# Patient Record
Sex: MALE | Race: WHITE | URBAN - METROPOLITAN AREA
[De-identification: names, ages, dates, MRNs, and addresses within clinical notes are randomized per-mention and may not be internally consistent; named-entity substitution may affect disease eponyms.]

---

## 2017-02-04 ENCOUNTER — EMERGENCY (EMERGENCY)
Facility: HOSPITAL | Age: 52
LOS: 0 days | Discharge: HOME | End: 2017-02-04
Admitting: INTERNAL MEDICINE

## 2017-06-27 DIAGNOSIS — L50.8 OTHER URTICARIA: ICD-10-CM

## 2017-06-27 DIAGNOSIS — Z79.899 OTHER LONG TERM (CURRENT) DRUG THERAPY: ICD-10-CM

## 2018-04-01 ENCOUNTER — EMERGENCY (EMERGENCY)
Facility: HOSPITAL | Age: 53
LOS: 0 days | Discharge: HOME | End: 2018-04-01
Attending: EMERGENCY MEDICINE

## 2018-04-01 VITALS
SYSTOLIC BLOOD PRESSURE: 163 MMHG | DIASTOLIC BLOOD PRESSURE: 109 MMHG | OXYGEN SATURATION: 96 % | RESPIRATION RATE: 16 BRPM | WEIGHT: 175.05 LBS | HEIGHT: 71 IN | TEMPERATURE: 98 F | HEART RATE: 86 BPM

## 2018-04-01 VITALS
RESPIRATION RATE: 18 BRPM | OXYGEN SATURATION: 99 % | DIASTOLIC BLOOD PRESSURE: 95 MMHG | SYSTOLIC BLOOD PRESSURE: 157 MMHG

## 2018-04-01 DIAGNOSIS — Z23 ENCOUNTER FOR IMMUNIZATION: ICD-10-CM

## 2018-04-01 DIAGNOSIS — S01.81XA LACERATION WITHOUT FOREIGN BODY OF OTHER PART OF HEAD, INITIAL ENCOUNTER: ICD-10-CM

## 2018-04-01 DIAGNOSIS — S61.411A LACERATION WITHOUT FOREIGN BODY OF RIGHT HAND, INITIAL ENCOUNTER: ICD-10-CM
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
## 2018-04-01 DIAGNOSIS — S09.90XA UNSPECIFIED INJURY OF HEAD, INITIAL ENCOUNTER: ICD-10-CM
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
## 2018-04-01 DIAGNOSIS — Z79.891 LONG TERM (CURRENT) USE OF OPIATE ANALGESIC: ICD-10-CM
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
## 2018-04-01 DIAGNOSIS — Z98.890 OTHER SPECIFIED POSTPROCEDURAL STATES: Chronic | ICD-10-CM

## 2018-04-01 DIAGNOSIS — Z98.890 OTHER SPECIFIED POSTPROCEDURAL STATES: ICD-10-CM
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
## 2018-04-01 RX ORDER — MORPHINE SULFATE 50 MG/1
1 CAPSULE, EXTENDED RELEASE ORAL
Qty: 0 | Refills: 0 | COMMUNITY

## 2018-04-01 RX ORDER — TETANUS TOXOID, REDUCED DIPHTHERIA TOXOID AND ACELLULAR PERTUSSIS VACCINE, ADSORBED 5; 2.5; 8; 8; 2.5 [IU]/.5ML; [IU]/.5ML; UG/.5ML; UG/.5ML; UG/.5ML
0.5 SUSPENSION INTRAMUSCULAR ONCE
Qty: 0 | Refills: 0 | Status: COMPLETED | OUTPATIENT
Start: 2018-04-01 | End: 2018-04-01

## 2018-04-01 RX ORDER — OXYCODONE HYDROCHLORIDE 5 MG/1
1 TABLET ORAL
Qty: 0 | Refills: 0 | COMMUNITY

## 2018-04-01 RX ADMIN — TETANUS TOXOID, REDUCED DIPHTHERIA TOXOID AND ACELLULAR PERTUSSIS VACCINE, ADSORBED 0.5 MILLILITER(S): 5; 2.5; 8; 8; 2.5 SUSPENSION INTRAMUSCULAR at 22:35

## 2018-04-01 NOTE — ED PROVIDER NOTE - PROGRESS NOTE DETAILS
Lacerations sutured. TDAP updated at today's visit. Return precautions discussed at length and verbalized agreement. Instructed pt 5 days for facial laceration and 14 days for hand laceration.

## 2018-04-01 NOTE — ED PROVIDER NOTE - OBJECTIVE STATEMENT
Pt is a 52 y/o Male, PMHX of chronic back pain, presents to ED with lacerations s/p fall. Pt reports he was leaving Easter dinner, was outside and turned around to say goodbye to his family, when he became unsteady due to carrying bags in his hands and fell. Has laceration to right side of face and on right hand. Denies LOC, numbness, tingling, weakness, blood thinning meds, bleeding disorders.

## 2018-04-01 NOTE — ED PROVIDER NOTE - ATTENDING CONTRIBUTION TO CARE
I personally evaluated the patient. I reviewed the Resident’s or Physician Assistant’s note (as assigned above), and agree with the findings and plan except as documented in my note.  Pt with lacerations as above s/p mechanical trip and fall, no loc, has right eyebrow laceration, perrla (s/p cataract sx), eomi, no orbital rim stepoff or ttp, neck supple neuro intact, and lac over palmar aspect 5th mcpj, no bony ttp, FROM NVI no tendon injury, wounds repaired. Patient counseled regarding conditions which should prompt return.

## 2018-04-01 NOTE — ED PROVIDER NOTE - CARE PLAN
Principal Discharge DX:	Facial laceration, initial encounter  Secondary Diagnosis:	Laceration of right hand without foreign body, initial encounter  Secondary Diagnosis:	Closed head injury, initial encounter

## 2018-04-01 NOTE — ED PROVIDER NOTE - PHYSICAL EXAMINATION
CONSTITUTIONAL: Awake, alert. Well-developed; well-nourished; in no distress. Non-toxic appearing.   SKIN: + 2.5 cm laceration to right side of face just above right eyebrow; bleeding controlled. Wound explored to base in bloodless field with no FB or tendon injury. + 3 cm laceration to right hand on ventral lateral aspect; bleeding controlled; wound explored to base in bloodless field with no FB or tendon injury.   HEAD: See skin as documented above. No step offs or tenderness.   EYES: PERRL, EOM intact.   NECK: Supple; non-tender.  CARD: No chest wall deformity or tenderness. S1, S2 normal; no murmurs, gallops, or rubs. Regular rate and rhythm.  RESP: Good air movement. Lungs CTAB. No crackles, wheezes, rales or rhonchi.  ABD: Soft; non-distended; non-tender. No rebound/guarding/rigidity.   EXT: See skin as documented above. No bony deformity or tenderness. Flexion and extension intact. Normal ROM x 4 extremities. No midline spinal ttp.   NEURO: Strength 5/5 UE b/l. No sensory deficits. n/v intact UE b/l, pulses symmetrical.   PSYCH: Cooperative, appropriate.

## 2018-04-01 NOTE — ED PROVIDER NOTE - SECONDARY DIAGNOSIS.
Laceration of right hand without foreign body, initial encounter Closed head injury, initial encounter

## 2018-04-01 NOTE — ED PROVIDER NOTE - NS ED ROS FT
GENERAL: Denies fever/chills, loss of appetite/weight or fatigue.  SKIN: + laceration to right side of face. + laceration to right hand.   HEAD: + closed head injury.   EYES: Denies blurry vision, diplopia, or photophobia.  CARDIAC: Denies chest pain, palpitations, or SOB.   RESPIRATORY: Denies SOB, cough, hemoptysis or wheezing.   GI: Denies abdominal pain, n/v/d.   MSK: Denies myalgias, bony deformity or pain.   NEURO: Denies paresthesias, tingling or weakness.

## 2018-04-06 ENCOUNTER — TRANSCRIPTION ENCOUNTER (OUTPATIENT)
Age: 53
End: 2018-04-06

## 2018-04-15 ENCOUNTER — TRANSCRIPTION ENCOUNTER (OUTPATIENT)
Age: 53
End: 2018-04-15

## 2018-10-16 PROBLEM — M54.9 DORSALGIA, UNSPECIFIED: Chronic | Status: ACTIVE | Noted: 2018-04-01

## 2018-10-30 PROBLEM — Z00.00 ENCOUNTER FOR PREVENTIVE HEALTH EXAMINATION: Status: ACTIVE | Noted: 2018-10-30

## 2019-01-02 ENCOUNTER — APPOINTMENT (OUTPATIENT)
Dept: ORTHOPEDIC SURGERY | Facility: CLINIC | Age: 54
End: 2019-01-02
Payer: MEDICAID

## 2019-01-02 ENCOUNTER — TRANSCRIPTION ENCOUNTER (OUTPATIENT)
Age: 54
End: 2019-01-02

## 2019-01-02 VITALS
WEIGHT: 180 LBS | BODY MASS INDEX: 25.2 KG/M2 | DIASTOLIC BLOOD PRESSURE: 70 MMHG | HEIGHT: 71 IN | SYSTOLIC BLOOD PRESSURE: 100 MMHG

## 2019-01-02 DIAGNOSIS — Z60.2 PROBLEMS RELATED TO LIVING ALONE: ICD-10-CM

## 2019-01-02 DIAGNOSIS — Z87.39 PERSONAL HISTORY OF OTHER DISEASES OF THE MUSCULOSKELETAL SYSTEM AND CONNECTIVE TISSUE: ICD-10-CM

## 2019-01-02 DIAGNOSIS — Z87.891 PERSONAL HISTORY OF NICOTINE DEPENDENCE: ICD-10-CM

## 2019-01-02 DIAGNOSIS — Z78.9 OTHER SPECIFIED HEALTH STATUS: ICD-10-CM

## 2019-01-02 PROCEDURE — 99204 OFFICE O/P NEW MOD 45 MIN: CPT

## 2019-01-02 RX ORDER — MULTIVITAMIN
TABLET ORAL
Refills: 0 | Status: ACTIVE | COMMUNITY

## 2019-01-02 RX ORDER — TURMERIC ROOT EXTRACT 500 MG
TABLET ORAL
Refills: 0 | Status: ACTIVE | COMMUNITY

## 2019-01-02 SDOH — SOCIAL STABILITY - SOCIAL INSECURITY: PROBLEMS RELATED TO LIVING ALONE: Z60.2

## 2019-03-05 ENCOUNTER — TRANSCRIPTION ENCOUNTER (OUTPATIENT)
Age: 54
End: 2019-03-05

## 2019-03-12 ENCOUNTER — APPOINTMENT (OUTPATIENT)
Dept: ORTHOPEDIC SURGERY | Facility: CLINIC | Age: 54
End: 2019-03-12
Payer: MEDICAID

## 2019-03-12 PROCEDURE — 99215 OFFICE O/P EST HI 40 MIN: CPT

## 2019-03-12 RX ORDER — DIAZEPAM 5 MG/1
5 TABLET ORAL
Qty: 1 | Refills: 0 | Status: DISCONTINUED | COMMUNITY
Start: 2019-01-02 | End: 2019-03-12

## 2019-03-25 RX ORDER — CHROMIUM 200 MCG
TABLET ORAL
Refills: 0 | Status: ACTIVE | COMMUNITY

## 2019-03-27 NOTE — DISCUSSION/SUMMARY
[de-identified] : Discussed the results of the patient's history, physical exam, and imaging. Mr. Bower continues to have low back pain that radiates posteriorly down both legs to his feet. Significantly disabled by symptoms. MRI lumbar with and without contrast shows L5/S1 laminectomy, L4-S1 posterior spinal fusion, well decompressed, no adjacent segment disease. ESR/CRP/CBC within normal limits. There is no indication for surgical intervention at this time. I am recommending a spinal cord stimulator trial, referral to Dr. Garcia given. The patient will follow up with me as needed.  All questions were answered.

## 2019-03-27 NOTE — PHYSICAL EXAM
[de-identified] : Spine: General: patient is well developed, well nourished, in no acute \par distress, alert and oriented x 3. \par \par Mood and affect: normal\par \par Respiratory: no respiratory distress noted\par \par Skin: no scars over spine, skin intact, no erythema, increased warmth\par \par Alignment:The spine is well compensated in the coronal and sagittal plane. There is no asymmetry on the pozo forward bend test\par \par Gait: The patient is able to toe walk and heel walk without difficulty. The patient is able to tandem gait without difficulty.\par \par Palpation: no tenderness to palpation spine or paraspinal region\par \par Range of motion: Lumbar spine ROM is restricted\par \par Neurologic Exam:\par Motor: Manual Muscle testing in the lower extremities is 5 out of 5 in all muscle groups. There is no evidence of muscular atrophy in the lower extremities. Sensory: Sensation to light touch is grossly intact in the lower extremities\par \par Reflexes: DTR are present and symmetric throughout, negative davis bilaterally, negative inverted radial reflex bilaterally, no clonus, plantar responses are flexor\par \par Hip Exam: No pain with internal or external rotation of hips bilaterally\par \par Special tests: Straight leg raise test negative. Cross straight leg test negative. CELY test negative\par \par Vascular: Examination of the peripheral vascular system demonstrates no evidence of congestion or edema. no evidence of lymphedema bilateral lower extremities, pulses are present and symmetric in both lower extremities. [de-identified] : MRI lumbar with and without contrast 1/30/19: s/p L5/S1 laminectomy and posterior fusion L4-S1, well decompressed L5/S1, no canal or foraminal stenosis; L2/3 and L3/4 mild disc bulge with mild flattening of ventral cord, mild bilateral foraminal stenosis\par \par CT Lumbar 9/2018: L4-S1 instrumented fusion, appears solid with no loosening of the hardware. No significant stenosis seen. \par \par MRI 2016: s/p L4-S1 instrumented fusion with no residual or adjacent segment stenosis.\par \par XR 1/2019: L4-S1 instrumented fusion, no hardware failure, maintained alignment, no dynamic instability. \par \par

## 2019-03-27 NOTE — HISTORY OF PRESENT ILLNESS
[de-identified] : Follow Up 3/12/219: Patient continues to have low back pain that radiates posteriorly down both legs to his feet, unchanged. Also reports paresthesias in the right foot. Denies lower extremity weakness. Denies bowel/bladder symptoms. Here to discuss blood work and MRI lumbar.\par \par Initial 1/2/19: Mr. JOHNSON is a very pleasant 53 year old male who complains of low back pain for 9 years, atraumatic. Pain was initially localized to lower back. Patient had lumbar laminectomy/posterior fusion in 2012, with mild relief of low back pain. Patient reports low back pain began to radiate posteriorly down both legs to his feet after an ablation in 2014. Had revision laminectomy and extension of fusion in 2016, without any relief of back or leg pain. Leg pain has worsened gradually over the past 2 years. On initial presentation symptoms were as follows: Patient described the pain as constant. Patient rated the pain as 8/10 today, average 8/10 this week, 10/10 at its worst. Patient reports numbness and paresthesias in his right foot. The pain is relieved by nothing in particular. The pain is worsened by activity. Past treatments included pharmacologic management, with minimal temporary relief. The patient has not had recent physical therapy or steroid injections.\par \par The patient reports no loss of hand dexterity.\par The patient states there no loss of balance when walking.\par There no sensory loss in the arms or legs\par The patent no difficulty with urination.\par \par The patient had a lumbar laminectomy/psf in 2012 with Dr. Peña, revision laminectomy and extension of fusion in 2016 by Dr. Zeny Villafuerte\par \par Pain:\par Back 20 Right Leg 60 Left Leg 20\par \par The patient has no history of unexpected weight loss, no history of active cancer, no history bladder or bowel dysfunction, no night pain, no fevers or chills.\par \par The past medical history, surgical history, family history, allergies, medications, review of systems, family history and social history were reviewed and non contributory.

## 2019-05-03 ENCOUNTER — APPOINTMENT (OUTPATIENT)
Dept: NEUROSURGERY | Facility: CLINIC | Age: 54
End: 2019-05-03
Payer: MEDICAID

## 2019-05-03 VITALS
DIASTOLIC BLOOD PRESSURE: 73 MMHG | SYSTOLIC BLOOD PRESSURE: 108 MMHG | WEIGHT: 180 LBS | BODY MASS INDEX: 25.2 KG/M2 | HEIGHT: 71 IN | OXYGEN SATURATION: 92 % | HEART RATE: 80 BPM | RESPIRATION RATE: 18 BRPM

## 2019-05-03 PROCEDURE — 99203 OFFICE O/P NEW LOW 30 MIN: CPT

## 2019-05-23 NOTE — REVIEW OF SYSTEMS
[Tingling] : tingling [Difficulty Walking] : difficulty walking [Abnormal Sensation] : an abnormal sensation [Limping] : limping [As Noted in HPI] : as noted in HPI [Limb Pain] : limb pain [Negative] : Respiratory [Abdominal Pain] : no abdominal pain [Vomiting] : no vomiting [Diarrhea] : no diarrhea [Dysuria] : no dysuria [Incontinence] : no incontinence [Skin Lesions] : no skin lesions [Skin Wound] : no skin wound [Itching] : no itching [Easy Bleeding] : no tendency for easy bleeding [Easy Bruising] : no tendency for easy bruising

## 2019-05-23 NOTE — HISTORY OF PRESENT ILLNESS
[> 3 months] : more  than 3 months [FreeTextEntry1] : chronic back pain, LE pain [de-identified] : Mr. Bower is a 53 yo male with PMH of cataracts, detached retina, and non traumatic chronic back pain with onset of 10 years.  He is s/p 2012 lumbar laminectomy/fusion he had only a few months of partial pain relief.  In 2014 after a nerve ablation pain started radiating posteriorly down bilateral LE , and 2016 revision of laminectomy and extension of fusion with no relief of pain.  He arrives today referred by Dr. Hughes to discuss spinal cord stimulation, however, the patient thought we were discussing ITP (pain pump).  We will discuss all options today.  Pain management now includes pain medication (see below), and no recent PT or injections.\par \par Pain:\par 75% low back, 25% LE (Right > Left changes based on activity)\par Low back pain is constant right more than left radiates to mid thoracic. \par Pain now 9/10, range 6/10 to 10/10\par Buttock pain, posterior thighs, and posterior calves (occasional burning), Tingling in all toes.  \par Pain now 9/10, range 0/10 to 10/10\par  \par Pain management: Dr. Bonner ([place]) current pain medications:\par Percocet 10/325 1 tab TID\par Morphine 60 mg 1 tab TID

## 2019-05-23 NOTE — PHYSICAL EXAM
[General Appearance - In No Acute Distress] : in no acute distress [General Appearance - Alert] : alert [General Appearance - Well Nourished] : well nourished [Oriented To Time, Place, And Person] : oriented to person, place, and time [General Appearance - Well Developed] : well developed [Affect] : the affect was normal [Mood] : the mood was normal [Impaired Insight] : insight and judgment were intact [Cranial Nerves Oculomotor (III)] : extraocular motion intact [Cranial Nerves Optic (II)] : visual acuity intact bilaterally,  pupils equal round and reactive to light [Cranial Nerves Trigeminal (V)] : facial sensation intact symmetrically [Cranial Nerves Vestibulocochlear (VIII)] : hearing was intact bilaterally [Cranial Nerves Facial (VII)] : face symmetrical [Cranial Nerves Glossopharyngeal (IX)] : tongue and palate midline [Cranial Nerves Accessory (XI - Cranial And Spinal)] : head turning and shoulder shrug symmetric [Cranial Nerves Hypoglossal (XII)] : there was no tongue deviation with protrusion [Sensation Tactile Decrease] : light touch was intact [Motor Strength] : muscle strength was normal in all four extremities [Balance] : balance was intact [Normal] : normal [Able to toe walk] : the patient was able to toe walk [Able to heel walk] : the patient was able to heel walk [Antalgic] : antalgic [Intact] : all reflexes within normal limits bilaterally [PERRL With Normal Accommodation] : pupils were equal in size, round, reactive to light, with normal accommodation [Hearing Threshold Finger Rub Not [place]] : hearing was normal [] : no respiratory distress [Respiration, Rhythm And Depth] : normal respiratory rhythm and effort [Involuntary Movements] : no involuntary movements were seen [Edema] : there was no peripheral edema [Abnormal Walk] : normal gait [Skin Color & Pigmentation] : normal skin color and pigmentation [Motor Tone] : muscle strength and tone were normal [Romberg's Sign] : Romberg's sign was negtive [Limited Balance] : balance was intact [Tremor] : no tremor present [Plantar Reflex Right Only] : normal on the right [Plantar Reflex Left Only] : normal on the left [___] : absent on the right [___] : absent on the left [Straight-Leg Raise Test - Left] : straight leg raise of the left leg was negative [Straight-Leg Raise Test - Right] : straight leg raise  of the right leg was negative

## 2019-05-23 NOTE — REASON FOR VISIT
[Consultation] : a consultation visit [Referred By: _________] : Patient was referred by LORIE [Other: _____] : [unfilled] [FreeTextEntry1] : chronic pain/ SCS consult

## 2019-10-02 PROBLEM — Z60.2 PERSON LIVING ALONE: Status: ACTIVE | Noted: 2019-01-02

## 2022-07-11 ENCOUNTER — APPOINTMENT (OUTPATIENT)
Dept: PAIN MANAGEMENT | Facility: CLINIC | Age: 57
End: 2022-07-11

## 2022-07-11 VITALS
DIASTOLIC BLOOD PRESSURE: 97 MMHG | HEART RATE: 66 BPM | BODY MASS INDEX: 25.2 KG/M2 | SYSTOLIC BLOOD PRESSURE: 147 MMHG | HEIGHT: 71 IN | WEIGHT: 180 LBS

## 2022-07-11 DIAGNOSIS — Z87.39 PERSONAL HISTORY OF OTHER DISEASES OF THE MUSCULOSKELETAL SYSTEM AND CONNECTIVE TISSUE: ICD-10-CM

## 2022-07-11 DIAGNOSIS — Z86.79 PERSONAL HISTORY OF OTHER DISEASES OF THE CIRCULATORY SYSTEM: ICD-10-CM

## 2022-07-11 DIAGNOSIS — Z56.0 UNEMPLOYMENT, UNSPECIFIED: ICD-10-CM

## 2022-07-11 DIAGNOSIS — Z87.09 PERSONAL HISTORY OF OTHER DISEASES OF THE RESPIRATORY SYSTEM: ICD-10-CM

## 2022-07-11 PROCEDURE — 99213 OFFICE O/P EST LOW 20 MIN: CPT

## 2022-07-11 SDOH — ECONOMIC STABILITY - INCOME SECURITY: UNEMPLOYMENT, UNSPECIFIED: Z56.0

## 2022-07-11 NOTE — DATA REVIEWED
[FreeTextEntry1] : MRI of the lumbar spine with and without contrast dated January 30, 2019, shows status post bilateral laminectomy at L5-S1 with posterior fusion at the L4-5, L5-S1 levels. Mild grade 1 retrolisthesis at L3-4, degenerative disc changes in the lumbar spine superimposed on congenital spinal canal stenosis, L2-3, L3-4 mild flattening at the ventral margin of the thecal sac with mild bilateral foraminal narrowing at the disc space level due to mild disc bulges.

## 2022-07-11 NOTE — HISTORY OF PRESENT ILLNESS
[FreeTextEntry1] : HISTORY OF PRESENT ILLNESS: As a review, Mr. Bower is a 57-year-old gentleman who we have been following for many years for chronic lower back pain and lumbar radiculopathy. Over the years, he has undergone lumbar epidural steroid injections without significant relief. He is status post L4-5 fusion in 2012 and extension of his fusion in L4-5 in 2016. He still experiences chronic pain with intermittent symptoms of radiculopathy.\par \par ACTIVITIES: No change in pain with relaxation, bowel movements.\par \par PRIOR PAIN MEDICATIONS: Morphine, Oxycodone, Tramadol, Motrin, Naproxen, Baclofen.\par \par PRIOR PAIN TREATMENTS: No relief with injections, PT, exercise, TENS, heat/cold treatment. Moderate relief with surgery.\par \par TODAY: he returns for a revisit encounter. He notes stable pain control with the use of medication management. Relief reported at 50% pending his level of activity. As of late, he has been more active as he is getting ready to relocate. With that, he notes additional strains within the lumbar segment which have been troublesome. He has been able to shift positions with improvements in pain noted; he often requires assistance such activities. \par \par He ambulates with a cane for support.

## 2022-07-11 NOTE — DISCUSSION/SUMMARY
[de-identified] : Mr. Bower is medically managed at our office due to his above mentioned chronic pain condition affecting the lumbosacral region. He will continue with MS Contin and Percocet as prescribed. He will follow up in 4 weeks.\par \par UDS collected as per June 2022 encounter; cannot locate in criterion system- staff to inquire regarding placement of lab study.\par \par The patient is stable on current pain medication with analgesia and without notable side effects or any obvious aberrant behaviors exhibited. Will renew medication today.\par \par f/u 4 weeks\par \par I personally reviewed with the PA, this patient's history and physical exam findings, as documented above. I have discussed the relevant areas of concern, having direct implications to the presenting problems and illnesses, and I have personally examined all pertinent and positive and negative findings, which impact on the prior pain management treatment. \par \par DONNELL Coe MD\par \par

## 2022-07-11 NOTE — REVIEW OF SYSTEMS
[Arthralgia] : arthralgia [Joint Pain] : joint pain [Chills] : no chills [Discharge] : no discharge [Decrease Hearing] : no decrease in hearing [Lower Ext Edema] : no lower extremity edema [SOB at rest] : no shortness of breath at rest [Abdominal Pain] : no abdominal pain

## 2022-07-11 NOTE — PHYSICAL EXAM
[Normal Coordination] : normal coordination [Normal Sensation] : normal sensation [Orientated] : orientated [Well Developed] : well developed [Well Nourished] : well nourished [Peripheral vascular exam is grossly normal] : peripheral vascular exam is grossly normal [No Respiratory Distress] : no respiratory distress [de-identified] : Palpation of the thoracic/lumbar spine is as follows: bilateral lumbar paraspinal tenderness. Range of motion of the thoracic and lumbar spine is as follows: Diminished range of motion in all planes. Gait and function is as follows: mildly antalgic gait.

## 2022-08-08 ENCOUNTER — APPOINTMENT (OUTPATIENT)
Dept: PAIN MANAGEMENT | Facility: CLINIC | Age: 57
End: 2022-08-08

## 2022-08-08 VITALS
WEIGHT: 180 LBS | HEART RATE: 90 BPM | SYSTOLIC BLOOD PRESSURE: 136 MMHG | BODY MASS INDEX: 25.2 KG/M2 | HEIGHT: 71 IN | DIASTOLIC BLOOD PRESSURE: 98 MMHG

## 2022-08-08 LAB
AMPHET UR-MCNC: NORMAL
BARBITURATES UR-MCNC: NORMAL
BENZODIAZ UR-MCNC: NORMAL
COCAINE METAB.OTHER UR-MCNC: NORMAL
OPIATES UR-MCNC: NORMAL

## 2022-08-08 PROCEDURE — 80305 DRUG TEST PRSMV DIR OPT OBS: CPT | Mod: QW

## 2022-08-08 PROCEDURE — 99213 OFFICE O/P EST LOW 20 MIN: CPT

## 2022-08-08 NOTE — PHYSICAL EXAM
[Normal Coordination] : normal coordination [Normal Sensation] : normal sensation [Orientated] : orientated [Well Developed] : well developed [Well Nourished] : well nourished [Peripheral vascular exam is grossly normal] : peripheral vascular exam is grossly normal [No Respiratory Distress] : no respiratory distress [de-identified] : Palpation of the thoracic/lumbar spine is as follows: bilateral lumbar paraspinal tenderness. Range of motion of the thoracic and lumbar spine is as follows: Diminished range of motion in all planes. Gait and function is as follows: mildly antalgic gait.

## 2022-08-08 NOTE — DISCUSSION/SUMMARY
[de-identified] : Mr. Bower is medically managed at our office due to his above mentioned chronic pain condition affecting the lumbosacral region. He will continue with MS Contin and Percocet as prescribed. He will follow up in 4 weeks.\par \par UDS done today\par \par The patient is stable on current pain medication with analgesia and without notable side effects or any obvious aberrant behaviors exhibited. Will renew medication today.\par \par f/u 4 weeks\par \par I personally reviewed with the PA, this patient's history and physical exam findings, as documented above. I have discussed the relevant areas of concern, having direct implications to the presenting problems and illnesses, and I have personally examined all pertinent and positive and negative findings, which impact on the prior pain management treatment. \par \par DONNELL Jimenez MD\par \par

## 2022-08-08 NOTE — HISTORY OF PRESENT ILLNESS
[FreeTextEntry1] : HISTORY OF PRESENT ILLNESS: As a review, Mr. Bower is a 57-year-old gentleman who we have been following for many years for chronic lower back pain and lumbar radiculopathy. Over the years, he has undergone lumbar epidural steroid injections without significant relief. He is status post L4-5 fusion in 2012 and extension of his fusion in L4-5 in 2016. He still experiences chronic pain with intermittent symptoms of radiculopathy.\par \par ACTIVITIES: No change in pain with relaxation, bowel movements.\par \par PRIOR PAIN MEDICATIONS: Morphine, Oxycodone, Tramadol, Motrin, Naproxen, Baclofen.\par \par PRIOR PAIN TREATMENTS: No relief with injections, PT, exercise, TENS, heat/cold treatment. Moderate relief with surgery.\par \par TODAY: he returns for a revisit encounter. He notes stable pain control with the use of medication management. Relief reported at 50% pending his level of activity. As of late, he continues to be more active as he is getting ready to relocate. With that, he notes additional strains within the lumbar region which have been troublesome.\par \par He ambulates with a cane for support. \par \par

## 2022-09-06 ENCOUNTER — APPOINTMENT (OUTPATIENT)
Dept: PAIN MANAGEMENT | Facility: CLINIC | Age: 57
End: 2022-09-06

## 2022-09-06 VITALS
HEIGHT: 71 IN | WEIGHT: 180 LBS | BODY MASS INDEX: 25.2 KG/M2 | HEART RATE: 81 BPM | SYSTOLIC BLOOD PRESSURE: 150 MMHG | DIASTOLIC BLOOD PRESSURE: 103 MMHG

## 2022-09-06 PROCEDURE — 99213 OFFICE O/P EST LOW 20 MIN: CPT

## 2022-09-06 NOTE — DATA REVIEWED
[FreeTextEntry1] : MRI of the lumbar spine with and without contrast dated January 30, 2019, shows status post bilateral laminectomy at L5-S1 with posterior fusion at the L4-5, L5-S1 levels. Mild grade 1 retrolisthesis at L3-4, degenerative disc changes in the lumbar spine superimposed on congenital spinal canal stenosis, L2-3, L3-4 mild flattening at the ventral margin of the thecal sac with mild bilateral foraminal narrowing at the disc space level due to mild disc bulges.\par \par SOAPP: low risk 9/06/22\par Low risk: Patient has combination of a low risk SOAP and no risk factors. UDS would be repeated randomly every quarter.\par \par UDS: No data obtained today\par \par No imaging ordered at this visit.\par \par No interventions ordered at this visit.\par \par [place] REGISTRY: Checked\par \par \par \par \par \par \par \par \par

## 2022-09-06 NOTE — ASSESSMENT
[FreeTextEntry1] : This is a 57 year old male with chronic lower back pain and lumbar radiculopathy.The patient is stable on current pain medication with analgesia and without notable side effects or any obvious aberrant behaviors exhibited. Will renew medication today. We follow up in 4 weeks.\par \par All this patients questions were answered and the conversation was understood well.\par \par Medications were given at today's visit.\par \par I explained the risk of addiction, tolerance and withdrawals. UDS will be done randomly and a drug agreement was signed.\par \par Risk factors: Bipolar illness, positive for any illicit drugs, history of ETOH and drug abuse, any signs of diversion, sharing medications, selling medications. Non-consistent [place] drug reporting and above 120 mEq of morphine.\par \par I advised the patient they must keep their medication under a lock and key, or in a safe place away from children or other individuals. This medication given is solely for the patient and under no circumstances to be shared. Patient verbalized this and is in agreement with the aforementioned. I explained the risk of addiction, tolerance, and withdrawals. UDS will be done randomly and a drug agreement was signed. \par \par \par \par \par I, Rocio Graham, attest that this documentation has been prepared under the direction and in the presence of Provider Yesenia Brasher MD.\par \par Thank you for allowing me to assist in the management of this patient. \par \par \par Best Regards, \par \par \par Yesenia Brasher M.D., [facility]\par \par \par Diplomate, American Board of Physical Medicine and Rehabilitation\par Diplomate, American Board of Pain Medicine \par Diplomate, American Board of Pain Management\par

## 2022-09-06 NOTE — HISTORY OF PRESENT ILLNESS
[FreeTextEntry1] : ORIGINAL PRESENTATION:   As a review, Mr. Bower is a 57-year-old gentleman who we have been following for many years for chronic lower back pain and lumbar radiculopathy. Over the years, he has undergone lumbar epidural steroid injections without significant relief. He is status post L4-5 fusion in 2012 and extension of his fusion in L4-5 in 2016. He still experiences chronic pain with intermittent symptoms of radiculopathy.\par \par \par ACTIVITIES: No change in pain with relaxation, bowel movements.\par \par PRIOR PAIN MEDICATIONS: Morphine, Oxycodone, Tramadol, Motrin, Naproxen, Baclofen.\par \par PRIOR PAIN TREATMENTS: No relief with injections, PT, exercise, TENS, heat/cold treatment. Moderate relief with surgery.\par \par \par \par PATIENT PRESENTS FOR REVISIT: He notes stable pain control with the use of medication management which is morphine sulfate ER 60 mg Q 8 hours and Percocet 10/325 t.i.d. p.r.n.. Relief reported at 50% pending his level of activity. As of late, he continues to be more active as he is getting ready to relocate. With that, he notes additional strains within the lumbar region which has been troublesome.  We discussed the possibility of lowering his medication.  He states that a decrease in medication would not be a good option at this time due to his increased activity related to him relocating. He ambulates with a cane for support. \par

## 2022-09-06 NOTE — PHYSICAL EXAM
[Normal Coordination] : normal coordination [Normal Sensation] : normal sensation [Orientated] : orientated [Well Developed] : well developed [Well Nourished] : well nourished [Peripheral vascular exam is grossly normal] : peripheral vascular exam is grossly normal [No Respiratory Distress] : no respiratory distress [de-identified] : Palpation of the thoracic/lumbar spine is as follows: bilateral lumbar paraspinal tenderness. Range of motion of the thoracic and lumbar spine is as follows: Diminished range of motion in all planes. Gait and function is as follows: mildly antalgic gait.

## 2022-10-19 ENCOUNTER — APPOINTMENT (OUTPATIENT)
Dept: PAIN MANAGEMENT | Facility: CLINIC | Age: 57
End: 2022-10-19

## 2022-10-19 VITALS
WEIGHT: 180 LBS | SYSTOLIC BLOOD PRESSURE: 136 MMHG | DIASTOLIC BLOOD PRESSURE: 90 MMHG | HEART RATE: 64 BPM | BODY MASS INDEX: 25.2 KG/M2 | HEIGHT: 71 IN

## 2022-10-19 PROCEDURE — 99213 OFFICE O/P EST LOW 20 MIN: CPT

## 2022-10-19 NOTE — DISCUSSION/SUMMARY
[de-identified] : I have consulted the  registry for the purpose of reviewing the patient's controlled substance.\par \par Overall there is at least a 30-50% reduction in pain with the prescribed analgesics. The patient denies any adverse side effects due to the medication (sleeping disturbance, constipation, sleepiness, hallucinations and/or urination problems). \par \par There are no inconsistencies on urine toxicology screening which would necessitate an alteration of therapy.\par \par The patient will return to the office in 4 weeks time and is aware to contact me with any question or concerns in the interim.\par \par Nannette Conway PA-C \par Yesenia Brasher MD\par \par \par

## 2022-10-19 NOTE — HISTORY OF PRESENT ILLNESS
[FreeTextEntry1] : ORIGINAL PRESENTATION:   As a review, Mr. Bower is a 57-year-old gentleman who we have been following for many years for chronic lower back pain and lumbar radiculopathy. Over the years, he has undergone lumbar epidural steroid injections without significant relief. He is status post L4-5 fusion in 2012 and extension of his fusion in L4-5 in 2016. He still experiences chronic pain with intermittent symptoms of radiculopathy.\par \par ACTIVITIES: No change in pain with relaxation, bowel movements.\par \par PRIOR PAIN MEDICATIONS: Morphine, Oxycodone, Tramadol, Motrin, Naproxen, Baclofen.\par \par PRIOR PAIN TREATMENTS: No relief with injections, PT, exercise, TENS, heat/cold treatment. Moderate relief with surgery.\par \par TODAY: Patient is here for a revisit appointment. He notes stable pain control with the use of medication management in the form of morphine sulfate ER 60 mg Q 8 hours and Percocet 10/325 t.i.d. p.r.n.. Relief reported at 50% pending his level of activity. He states his pain has been increased over the past few weeks due to relocating to a new house. He continues to ambulate with a cane for support. \par \par UDS: 08/2022.

## 2022-10-19 NOTE — PHYSICAL EXAM
[Normal Coordination] : normal coordination [Normal Sensation] : normal sensation [Orientated] : orientated [Well Developed] : well developed [Well Nourished] : well nourished [Peripheral vascular exam is grossly normal] : peripheral vascular exam is grossly normal [No Respiratory Distress] : no respiratory distress [de-identified] : Palpation of the thoracic/lumbar spine is as follows: bilateral lumbar paraspinal tenderness. Range of motion of the thoracic and lumbar spine is as follows: Diminished range of motion in all planes. Gait and function is as follows: mildly antalgic gait.

## 2022-10-19 NOTE — ASSESSMENT
[FreeTextEntry1] : This is a 57 year old male with chronic lower back pain and lumbar radiculopathy The patient is stable on current pain medications. Medications were refilled on 10/4. He does not need renewals on them today. He will follow up in 4 weeks.\par \par \par

## 2022-11-08 ENCOUNTER — EMERGENCY (EMERGENCY)
Facility: HOSPITAL | Age: 57
LOS: 0 days | Discharge: HOME | End: 2022-11-08
Attending: STUDENT IN AN ORGANIZED HEALTH CARE EDUCATION/TRAINING PROGRAM | Admitting: STUDENT IN AN ORGANIZED HEALTH CARE EDUCATION/TRAINING PROGRAM

## 2022-11-08 VITALS
RESPIRATION RATE: 18 BRPM | OXYGEN SATURATION: 100 % | TEMPERATURE: 97 F | SYSTOLIC BLOOD PRESSURE: 182 MMHG | WEIGHT: 175.05 LBS | DIASTOLIC BLOOD PRESSURE: 80 MMHG | HEART RATE: 55 BPM | HEIGHT: 71 IN

## 2022-11-08 DIAGNOSIS — M54.9 DORSALGIA, UNSPECIFIED: ICD-10-CM

## 2022-11-08 DIAGNOSIS — R11.0 NAUSEA: ICD-10-CM

## 2022-11-08 DIAGNOSIS — Z98.890 OTHER SPECIFIED POSTPROCEDURAL STATES: Chronic | ICD-10-CM

## 2022-11-08 DIAGNOSIS — G89.29 OTHER CHRONIC PAIN: ICD-10-CM

## 2022-11-08 PROCEDURE — 99283 EMERGENCY DEPT VISIT LOW MDM: CPT

## 2022-11-08 RX ORDER — MORPHINE SULFATE 50 MG/1
60 CAPSULE, EXTENDED RELEASE ORAL ONCE
Refills: 0 | Status: DISCONTINUED | OUTPATIENT
Start: 2022-11-08 | End: 2022-11-08

## 2022-11-08 RX ADMIN — MORPHINE SULFATE 60 MILLIGRAM(S): 50 CAPSULE, EXTENDED RELEASE ORAL at 20:22

## 2022-11-08 NOTE — ED PROVIDER NOTE - CLINICAL SUMMARY MEDICAL DECISION MAKING FREE TEXT BOX
57-year-old male presented today with nausea.  Patient appears to have symptomatology consistent with mild opiate withdrawal as he has not received his medications.  Patient at this time will be given a dose of his medications as confirmed on .  Patient instructed to have close follow-up with his pain management physician and at this time has prescription for his pain medications but was unable to fill them today.

## 2022-11-08 NOTE — ED ADULT NURSE NOTE - PRO INTERPRETER NEED 2
I have reviewed discharge instructions with the patient. The patient verbalized understanding. Patient armband removed and shredded
English

## 2022-11-08 NOTE — ED PROVIDER NOTE - OBJECTIVE STATEMENT
57y M pmh chronic back pain presents for pain medication. Pt is prescribed oxycodone 30mg TID and Morphine 60mg TID for his back pain but when he went to fill his prescription this month the pharmacy would not release his Morphine prescription. Now presents with increased back pain and nausea. States his pain management Dr. Brasher is in the process of getting the medication released from the pharmacy but his pain is getting worse.

## 2022-11-08 NOTE — ED PROVIDER NOTE - NS ED ATTENDING STATEMENT MOD
This was a shared visit with the ZAHEER. I reviewed and verified the documentation and independently performed the documented:

## 2022-11-08 NOTE — ED PROVIDER NOTE - PATIENT PORTAL LINK FT
You can access the FollowMyHealth Patient Portal offered by [facility] by registering at the following website: [email or web address]. By joining [facility]’s FollowMyHealth portal, you will also be able to view your health information using other applications (apps) compatible with our system.

## 2022-11-08 NOTE — ED PROVIDER NOTE - NSFOLLOWUPINSTRUCTIONS_ED_ALL_ED_FT
Follow up with PMD and Pain management in 1-2 days.    Chronic Pain    Chronic pain is a complex condition and the Emergency Department is not the ideal place to manage this condition. Prescription painkillers must be written by your primary care provider or a pain management physician. Avoid activities or triggers that exacerbate your pain.    SEEK IMMEDIATE MEDICAL CARE IF YOU HAVE ANY OF THE FOLLOWING SYMPTOMS: severe chest pain, fainting, vomiting blood, dark or bloody stools, or pain different from your chronic pain.

## 2022-11-08 NOTE — ED ADULT TRIAGE NOTE - CHIEF COMPLAINT QUOTE
"I ran out of my meds and I feel like i'm withdrawing"   pt stated he takes percocet and morphine for chronic back pain, has not had medication in 2 days   pt c/o nausea

## 2022-11-08 NOTE — ED PROVIDER NOTE - PHYSICAL EXAMINATION
CONST: NAD  EYES: Sclera and conjunctiva clear.   ENT: No nasal discharge. Oropharynx normal appearing   NECK: Non-tender, no meningeal signs. normal ROM. supple   CARD: S1 S2; No jvd  RESP: Equal BS B/L, No wheezes, rhonchi or rales. No distress  GI: Soft, non-tender, non-distended. no cva tenderness. normal BS  MS: Normal ROM in all extremities. pulses 2 +. no calf tenderness or swelling  SKIN: Warm, dry, no acute rashes. Good turgor  NEURO: A&Ox3, No focal deficits. Strength 5/5 with no sensory deficits. Steady gait.

## 2022-11-08 NOTE — ED ADULT TRIAGE NOTE - HEIGHT IN CM
Requested Prescriptions   Pending Prescriptions Disp Refills     zolpidem (AMBIEN) 10 MG tablet [Pharmacy Med Name: ZOLPIDEM 10MG TABLETS] 30 tablet 0     Sig: TAKE 1 TABLET BY MOUTH EVERY NIGHT AT BEDTIME    There is no refill protocol information for this order        Last Written Prescription Date:  1/18/19  Last Fill Quantity: 30,   # refills: 0  Last Office Visit: 12/31/18  Future Office visit:       Routing refill request to provider for review/approval because:  Drug not on the [facility], [facility] or [facility] refill protocol or controlled substance    
This Rx has been filled on 1/18/19.         
180.34

## 2022-11-08 NOTE — ED ADULT NURSE NOTE - OBJECTIVE STATEMENT
Patient states he sees pain management and has been on his MS contin for years, unable to refill script and feels like he is withdrawing.

## 2022-11-15 ENCOUNTER — LABORATORY RESULT (OUTPATIENT)
Age: 57
End: 2022-11-15

## 2022-11-15 ENCOUNTER — APPOINTMENT (OUTPATIENT)
Dept: PAIN MANAGEMENT | Facility: CLINIC | Age: 57
End: 2022-11-15

## 2022-11-15 VITALS — HEART RATE: 84 BPM | DIASTOLIC BLOOD PRESSURE: 94 MMHG | SYSTOLIC BLOOD PRESSURE: 136 MMHG

## 2022-11-15 LAB — OPIATES UR-MCNC: NORMAL

## 2022-11-15 PROCEDURE — 80305 DRUG TEST PRSMV DIR OPT OBS: CPT | Mod: QW

## 2022-11-15 PROCEDURE — 99214 OFFICE O/P EST MOD 30 MIN: CPT

## 2022-11-15 NOTE — HISTORY OF PRESENT ILLNESS
[FreeTextEntry1] : ORIGINAL PRESENTATION:   As a review, Mr. Bower is a 57-year-old gentleman who we have been following for many years for chronic lower back pain and lumbar radiculopathy. Over the years, he has undergone lumbar epidural steroid injections without significant relief. He is status post L4-5 fusion in 2012 and extension of his fusion in L4-5 in 2016. He still experiences chronic pain with intermittent symptoms of radiculopathy.\par \par ACTIVITIES: No change in pain with relaxation, bowel movements.\par \par PRIOR PAIN MEDICATIONS: Morphine, Oxycodone, Tramadol, Motrin, Naproxen, Baclofen.\par \par PRIOR PAIN TREATMENTS: No relief with injections, PT, exercise, TENS, heat/cold treatment. Moderate relief with surgery.\par \par PATIENT PRESENTS FOR FOLLOW UP: Patient is here for a revisit appointment. He notes stable pain control with the use of medication management in the form of morphine sulfate ER 60 mg Q 8 hours and Percocet 10/325 t.i.d. p.r.n.. Relief reported at 50% pending his level of activity. He states his pain has been increased over the past few weeks due to relocating to a new house. He continues to ambulate with a cane for support. Of note [facility] was not filling his prescription as written and decreased his MSO4 ER to 60 mg q 12 hrs. He notes more pain with the change in dose but we will continue it at the present time. He states he ended up in the hospital as a result. He will be switching pharmacies to avoid this issue further. \par \par Decreasing the Percocet was discussed today but he would like to remain unchanged at this time. \par \par

## 2022-11-15 NOTE — PHYSICAL EXAM
[Normal Coordination] : normal coordination [Normal Sensation] : normal sensation [Orientated] : orientated [Well Developed] : well developed [Well Nourished] : well nourished [Peripheral vascular exam is grossly normal] : peripheral vascular exam is grossly normal [No Respiratory Distress] : no respiratory distress [Normal DTR UE/LE] : normal DTR UE/LE  [Normal Mood and Affect] : normal mood and affect [Able to Communicate] : able to communicate [de-identified] : Palpation of the thoracic/lumbar spine is as follows: bilateral lumbar paraspinal tenderness. Range of motion of the thoracic and lumbar spine is as follows: Diminished range of motion in all planes. Gait and function is as follows: mildly antalgic gait.

## 2022-11-15 NOTE — DATA REVIEWED
[FreeTextEntry1] : MRI of the lumbar spine with and without contrast dated January 30, 2019, shows status post bilateral laminectomy at L5-S1 with posterior fusion at the L4-5, L5-S1 levels. Mild grade 1 retrolisthesis at L3-4, degenerative disc changes in the lumbar spine superimposed on congenital spinal canal stenosis, L2-3, L3-4 mild flattening at the ventral margin of the thecal sac with mild bilateral foraminal narrowing at the disc space level due to mild disc bulges.\par \par SOAPP: low risk 9/06/22\par Low risk: Patient has combination of a low risk SOAP and no risk factors. UDS would be repeated randomly every quarter.\par \par UDS: 11/15/22, +MOP, oxy\par \par No imaging ordered at this visit.\par \par No interventions ordered at this visit.\par \par [place] REGISTRY: Checked\par \par \par \par \par \par \par \par \par

## 2022-11-15 NOTE — ASSESSMENT
[FreeTextEntry1] : This is a 57 year old male with chronic lower back pain and lumbar radiculopathy The patient is stable on current pain medications. Medications were refilled on 11/3 and 11/9. He does not need renewals on them today. He will follow up in 7 weeks and call in 4 weeks for December medication refill. A UDS was performed today. \par \par I explained the risk of addiction, tolerance and withdrawals. UDS will be done randomly and a drug agreement was signed.\par \par \par I advised the patient they must keep their medication under a lock and key, or in a safe place away from children or other individuals. This medication given is solely for the patient and under no circumstances to be shared. Patient verbalized this and is in agreement with the aforementioned. I explained the risk of addiction, tolerance, and withdrawals. UDS will be done randomly and a drug agreement was signed.\par \par \par All this patients questions were answered and the conversation was understood well.\par \par I, Rocio Graham, attest that this documentation has been prepared under the direction and in the presence of Provider Yesenia Brasher MD.\par \par \par Thank you for allowing me to assist in the management of this patient. \par \par \par Best Regards, \par \par \par Yesenia Brasher M.D., [facility]\par \par \par Diplomate, American Board of Physical Medicine and Rehabilitation\par Diplomate, American Board of Pain Medicine \par Diplomate, American Board of Pain Management\par \par \par \par

## 2023-01-03 ENCOUNTER — APPOINTMENT (OUTPATIENT)
Dept: PAIN MANAGEMENT | Facility: CLINIC | Age: 58
End: 2023-01-03
Payer: MEDICAID

## 2023-01-03 VITALS
SYSTOLIC BLOOD PRESSURE: 137 MMHG | WEIGHT: 175 LBS | BODY MASS INDEX: 24.5 KG/M2 | HEIGHT: 71 IN | DIASTOLIC BLOOD PRESSURE: 77 MMHG | HEART RATE: 68 BPM

## 2023-01-03 PROCEDURE — 99214 OFFICE O/P EST MOD 30 MIN: CPT

## 2023-01-05 RX ORDER — MORPHINE SULFATE 60 MG/1
60 CAPSULE, EXTENDED RELEASE ORAL
Refills: 0 | Status: DISCONTINUED | COMMUNITY
End: 2023-01-05

## 2023-01-05 RX ORDER — OXYCODONE HYDROCHLORIDE AND ACETAMINOPHEN 10; 325 MG/1; MG/1
10-325 TABLET ORAL
Refills: 0 | Status: DISCONTINUED | COMMUNITY
End: 2023-01-05

## 2023-01-05 NOTE — REASON FOR VISIT
[Follow-Up Visit] : a follow-up pain management visit [FreeTextEntry2] : FOLLOW UP FOR MEDICATION REFILL

## 2023-01-05 NOTE — PHYSICAL EXAM
[Normal Coordination] : normal coordination [Normal DTR UE/LE] : normal DTR UE/LE  [Normal Sensation] : normal sensation [Normal Mood and Affect] : normal mood and affect [Orientated] : orientated [Able to Communicate] : able to communicate [Well Developed] : well developed [Well Nourished] : well nourished [Peripheral vascular exam is grossly normal] : peripheral vascular exam is grossly normal [No Respiratory Distress] : no respiratory distress [de-identified] : Palpation of the thoracic/lumbar spine is as follows: bilateral lumbar paraspinal tenderness. Range of motion of the thoracic and lumbar spine is as follows: Diminished range of motion in all planes. Gait and function is as follows: mildly antalgic gait.

## 2023-01-05 NOTE — ASSESSMENT
[FreeTextEntry1] : This is a 57 year old with chronic lower back pain and lumbar radiculopathy The patient is stable on current pain medications. He would like to continue with medication unchanged at this time.  All this patients questions were answered and the conversation was understood well.\par \par I explained the risk of addiction, tolerance and withdrawals. UDS will be done randomly and a drug agreement was signed.\par \par I advised the patient they must keep their medication under a lock and key, or in a safe place away from children or other individuals. This medication given is solely for the patient and under no circumstances to be shared. Patient verbalized this and is in agreement with the aforementioned. I explained the risk of addiction, tolerance, and withdrawals. UDS will be done randomly and a drug agreement was signed.\par \par \par I, Rocio Graham, attest that this documentation has been prepared under the direction and in the presence of Provider Yesenia Brasher MD.\par \par \par Thank you for allowing me to assist in the management of this patient. \par \par \par Best Regards, \par \par \par Yesenia Brasher M.D., FAAPMR\par \par \par Diplomate, American Board of Physical Medicine and Rehabilitation\par Diplomate, American Board of Pain Medicine \par Diplomate, American Board of Pain Management\par \par \par \par

## 2023-01-05 NOTE — DATA REVIEWED
[FreeTextEntry1] : MRI of the lumbar spine with and without contrast dated January 30, 2019, shows status post bilateral laminectomy at L5-S1 with posterior fusion at the L4-5, L5-S1 levels. Mild grade 1 retrolisthesis at L3-4, degenerative disc changes in the lumbar spine superimposed on congenital spinal canal stenosis, L2-3, L3-4 mild flattening at the ventral margin of the thecal sac with mild bilateral foraminal narrowing at the disc space level due to mild disc bulges.\par \par SOAPP: low risk 9/06/22\par Low risk: Patient has combination of a low risk SOAP and no risk factors. UDS would be repeated randomly every quarter.\par \par UDS: 11/15/22, +MOP, oxy.\par \par [place] REGISTRY: Checked\par

## 2023-01-05 NOTE — HISTORY OF PRESENT ILLNESS
[FreeTextEntry1] : ORIGINAL PRESENTATION:   As a review, Mr. Bower is a 57-year-old gentleman who we have been following for many years for chronic lower back pain and lumbar radiculopathy. Over the years, he has undergone lumbar epidural steroid injections without significant relief. He is status post L4-5 fusion in 2012 and extension of his fusion in L4-5 in 2016. He still experiences chronic pain with intermittent symptoms of radiculopathy.\par \par ACTIVITIES: No change in pain with relaxation, bowel movements.\par \par PRIOR PAIN MEDICATIONS: Morphine, Oxycodone, Tramadol, Motrin, Naproxen, Baclofen.\par \par PRIOR PAIN TREATMENTS: No relief with injections, PT, exercise, TENS, heat/cold treatment. Moderate relief with surgery.\par \par PATIENT PRESENTS FOR FOLLOW UP:  He notes stable pain control with the use of medication management in the form of morphine sulfate ER 60 mg Q 8 hours and Percocet 10/325 t.i.d. p.r.n.. Relief reported at 50% pending his level of activity. He states his pain has been increased over the past few weeks due to relocating to a new house. He continues to ambulate with a cane for support. [facility] prescription as written and decreased his MSO4 ER to 60 mg q 12 hrs. He notes more pain with the change in dose but we will continue it at the present time. He states he ended up in the hospital as a result. He will be switching pharmacies to avoid this issue further. \par \par Decreasing the Percocet was discussed today but he would like to remain unchanged at this time due to his increase in activity. \par \par

## 2023-02-01 ENCOUNTER — APPOINTMENT (OUTPATIENT)
Dept: PAIN MANAGEMENT | Facility: CLINIC | Age: 58
End: 2023-02-01
Payer: MEDICAID

## 2023-02-01 VITALS
SYSTOLIC BLOOD PRESSURE: 167 MMHG | HEART RATE: 61 BPM | BODY MASS INDEX: 24.5 KG/M2 | WEIGHT: 175 LBS | HEIGHT: 71 IN | DIASTOLIC BLOOD PRESSURE: 103 MMHG

## 2023-02-01 DIAGNOSIS — G89.29 LUMBAGO WITH SCIATICA, LEFT SIDE: ICD-10-CM

## 2023-02-01 DIAGNOSIS — M54.42 LUMBAGO WITH SCIATICA, LEFT SIDE: ICD-10-CM

## 2023-02-01 DIAGNOSIS — M54.41 LUMBAGO WITH SCIATICA, LEFT SIDE: ICD-10-CM

## 2023-02-01 PROCEDURE — 99213 OFFICE O/P EST LOW 20 MIN: CPT

## 2023-02-01 NOTE — ASSESSMENT
[FreeTextEntry1] : 57 year old male with chronic lumbar pain.  We will continue the above regimen and f/u in 4 weeks for re evaluation.\par \par I personally reviewed with the PA, this patient's history and physical exam findings, as documented above. I have discussed the relevant areas of concern, having direct implications to the presenting problems and illnesses, and I have personally examined all pertinent and positive and negative findings, which impact on the prior pain management treatment. \par \par \par Check of the  registry reveals compliance in regards to narcotic medication management use\par \par \par Maria Luisa Cross, MS, PA-C\par Yesenia Brasher MD\par

## 2023-02-01 NOTE — PHYSICAL EXAM
[Normal Coordination] : normal coordination [Normal DTR UE/LE] : normal DTR UE/LE  [Normal Sensation] : normal sensation [Normal Mood and Affect] : normal mood and affect [Orientated] : orientated [Able to Communicate] : able to communicate [Well Developed] : well developed [Well Nourished] : well nourished [Peripheral vascular exam is grossly normal] : peripheral vascular exam is grossly normal [No Respiratory Distress] : no respiratory distress [de-identified] : Palpation of the thoracic/lumbar spine is as follows: bilateral lumbar paraspinal tenderness. Range of motion of the thoracic and lumbar spine is as follows: Diminished range of motion in all planes. Gait and function is as follows: mildly antalgic gait.

## 2023-02-01 NOTE — HISTORY OF PRESENT ILLNESS
[FreeTextEntry1] : ORIGINAL PRESENTATION:   As a review, Mr. Bower is a 57-year-old gentleman who we have been following for many years for chronic lower back pain and lumbar radiculopathy. Over the years, he has undergone lumbar epidural steroid injections without significant relief. He is status post L4-5 fusion in 2012 and extension of his fusion in L4-5 in 2016. He still experiences chronic pain with intermittent symptoms of radiculopathy.\par \par ACTIVITIES: No change in pain with relaxation, bowel movements.\par \par PRIOR PAIN MEDICATIONS: Morphine, Oxycodone, Tramadol, Motrin, Naproxen, Baclofen.\par \par PRIOR PAIN TREATMENTS: No relief with injections, PT, exercise, TENS, heat/cold treatment. Moderate relief with surgery.\par \par TODAY: I had the pleasure of seeing Mr. Bower today in follow up.  His previous history and physical findings have been reviewed.  \par \par He is under our care for chronic lumbar pain with radiculopathy which he is receiving continuing active treatment for.  He states nothing has changed over the past few weeks with respect to his condition.  He continues to remain stable with respect to his condition.  He remains stable on a regimen of MS Contin ER 60 mg Q 8 hours and Percocet 10/325 t.i.d. p.r.n. which provides him with 50% relief allowing him to function and perform ADLs without side effects.  He is accepting the degree of relief he gets and we will continue as is without change.  He has expressed interest in a pain pump but will hold off for now.

## 2023-02-28 ENCOUNTER — LABORATORY RESULT (OUTPATIENT)
Age: 58
End: 2023-02-28

## 2023-03-01 ENCOUNTER — APPOINTMENT (OUTPATIENT)
Dept: PAIN MANAGEMENT | Facility: CLINIC | Age: 58
End: 2023-03-01
Payer: MEDICAID

## 2023-03-01 VITALS — HEIGHT: 71 IN | BODY MASS INDEX: 24.5 KG/M2 | WEIGHT: 175 LBS

## 2023-03-01 LAB
MOP / MORPHINE: POSITIVE
OXY / OXYCODONE: POSITIVE
THC / MARIJUANA: POSITIVE

## 2023-03-01 PROCEDURE — 99213 OFFICE O/P EST LOW 20 MIN: CPT

## 2023-03-01 PROCEDURE — 80305 DRUG TEST PRSMV DIR OPT OBS: CPT | Mod: QW

## 2023-03-01 NOTE — HISTORY OF PRESENT ILLNESS
[FreeTextEntry1] : ORIGINAL PRESENTATION:   As a review, Mr. Bower is a 58-year-old gentleman who we have been following for many years for chronic lower back pain and lumbar radiculopathy. Over the years, he has undergone lumbar epidural steroid injections without significant relief. He is status post L4-5 fusion in 2012 and extension of his fusion in L4-5 in 2016. He still experiences chronic pain with intermittent symptoms of radiculopathy.\par \par ACTIVITIES: No change in pain with relaxation, bowel movements.\par \par PRIOR PAIN MEDICATIONS: Morphine, Oxycodone, Tramadol, Motrin, Naproxen, Baclofen.\par \par PRIOR PAIN TREATMENTS: No relief with injections, PT, exercise, TENS, heat/cold treatment. Moderate relief with surgery.\par \par TODAY: I had the pleasure of seeing Mr. Bower today in follow up. His previous history and physical findings have been reviewed.  \par \par He is under our care for chronic lumbar pain with radiculopathy which he is receiving continuing active treatment for.  Symptoms are unchanged. He remains stable on a regimen of MS Contin ER 60 mg BID and Percocet 10/325 t.i.d. p.r.n. which provides him with 50% relief allowing him to function and perform ADLs without side effects. His MS Contin was decreased from TID to BID a few months ago, which he has not adjusted well to. He is accepting the degree of relief he gets with his current medication regimen and will continue as is without change.  He has expressed interest in a pain pump in the past, but wishes to hold off for now.\par \par UDS: repeated today, 3/1/23 - see separate note.

## 2023-03-01 NOTE — PHYSICAL EXAM
[Normal Coordination] : normal coordination [Normal DTR UE/LE] : normal DTR UE/LE  [Normal Sensation] : normal sensation [Normal Mood and Affect] : normal mood and affect [Orientated] : orientated [Able to Communicate] : able to communicate [Well Developed] : well developed [Well Nourished] : well nourished [Peripheral vascular exam is grossly normal] : peripheral vascular exam is grossly normal [No Respiratory Distress] : no respiratory distress [de-identified] : Palpation of the thoracic/lumbar spine is as follows: bilateral lumbar paraspinal tenderness. Range of motion of the thoracic and lumbar spine is as follows: Diminished range of motion in all planes. Gait and function is as follows: mildly antalgic gait.

## 2023-03-01 NOTE — DISCUSSION/SUMMARY
[de-identified] : I have consulted the  registry for the purpose of reviewing the patient's controlled substance.\par \par Overall there is at least a 30-50% reduction in pain with the prescribed analgesics. The patient denies any adverse side effects due to the medication (sleeping disturbance, constipation, sleepiness, hallucinations and/or urination problems). \par Urine drug screening collected today with rapid sample result consistent with given regimen. Sample to be sent for confirmatory testing.\par The patient will return to the office in 4 weeks time and is aware to contact me with any question or concerns in the interim.\par \par Nannette Conway PA-C \par Yesenia Brasher MD\par

## 2023-03-01 NOTE — DATA REVIEWED
[FreeTextEntry1] : MRI of the lumbar spine with and without contrast dated January 30, 2019, shows status post bilateral laminectomy at L5-S1 with posterior fusion at the L4-5, L5-S1 levels. Mild grade 1 retrolisthesis at L3-4, degenerative disc changes in the lumbar spine superimposed on congenital spinal canal stenosis, L2-3, L3-4 mild flattening at the ventral margin of the thecal sac with mild bilateral foraminal narrowing at the disc space level due to mild disc bulges.\par \par SOAPP: low risk 9/06/22\par Low risk: Patient has combination of a low risk SOAP and no risk factors. UDS would be repeated randomly every quarter.\par \par [place] REGISTRY: Checked\par

## 2023-03-01 NOTE — ASSESSMENT
[FreeTextEntry1] : 57 year old male with chronic pain. He will continue with his current medication regimen. He will f/u in 4 weeks for reevaluation.\par

## 2023-03-06 LAB
PM 6 MAM: NEGATIVE NG/ML
PM 7-AMINO-CLONAZ: NEGATIVE NG/ML
PM ALPHA-HYDROXY-ALPRAZOLAM: NEGATIVE NG/ML
PM ALPHA-HYDROXY-MIDAZOLAM: NEGATIVE NG/ML
PM ALPRAZOLAM: NEGATIVE NG/ML
PM AMOBARBITAL: NEGATIVE NG/ML
PM AMPHETAMINE INTERP: NEGATIVE
PM AMPHETAMINE: NEGATIVE NG/ML
PM BARBURATES INTERP: NEGATIVE
PM BEG: NEGATIVE NG/ML
PM BENZODIAZEPINES INTERP: NEGATIVE
PM BUPRENORPHINE INTERP: NEGATIVE
PM BUPRENORPHINE: NEGATIVE NG/ML
PM BUTALBITAL: NEGATIVE NG/ML
PM CLONAZEPAM: NEGATIVE NG/ML
PM COCAINE INTERP: NEGATIVE
PM COCAINE: NEGATIVE NG/ML
PM CODIENE: 233 NG/ML
PM COTININE: NEGATIVE NG/ML
PM DIAZEPAM: NEGATIVE NG/ML
PM DIHYROCODEINE: NEGATIVE NG/ML
PM EDDP: NEGATIVE NG/ML
PM FENTANYL INTERP: NEGATIVE
PM FENTANYL: NEGATIVE NG/ML
PM FLUNITRAZEPAM: NEGATIVE NG/ML
PM FLURAZEPAM: NEGATIVE NG/ML
PM HYDROCODONE: 43 NG/ML
PM HYDROMORPHONE: 157 NG/ML
PM LORAZEPAM: NEGATIVE NG/ML
PM MARIJUANA (DELTA-9-THC): NEGATIVE NG/ML
PM MARIJUANA INTERP: NEGATIVE
PM MDA: NEGATIVE NG/ML
PM MDEA: NEGATIVE NG/ML
PM MDMA: NEGATIVE NG/ML
PM MEPERIDINE: NEGATIVE NG/ML
PM METHADONE INTERP: NEGATIVE
PM METHADONE: NEGATIVE NG/ML
PM METHAMPHETAMINE: NEGATIVE NG/ML
PM MIDAZOLAM: NEGATIVE NG/ML
PM MORPHINE: >1000 NG/ML
PM NALOXONE: NEGATIVE NG/ML
PM NALTREXONE: NEGATIVE NG/ML
PM NICOTINE INTERP: NEGATIVE
PM NORBUPRENORPHINE: NEGATIVE NG/ML
PM NORDIAZEPAM: NEGATIVE NG/ML
PM NORMEPERIDINE: NEGATIVE NG/ML
PM NOROXYCODONE: >1000 NG/ML
PM OPIOID INTERP: POSITIVE
PM OXAZEPAM: NEGATIVE NG/ML
PM OXXYCODONE INTERP: POSITIVE
PM OXYCODONE: >1000 NG/ML
PM OXYMORPHONE: 937 NG/ML
PM PCP: NEGATIVE NG/ML
PM PHENCYCLIDINE INTERP: NEGATIVE
PM PHENOBARBITAL: NEGATIVE NG/ML
PM PPX: NEGATIVE NG/ML
PM PROPOXYPHENE INTERP: NEGATIVE
PM SECOBARBITAL: NEGATIVE NG/ML
PM SUFENTANIL: NEGATIVE NG/ML
PM TAPENTADOL: NEGATIVE NG/ML
PM TEMAZEPAM: NEGATIVE NG/ML
PM TRAMADOL INTERP: NEGATIVE
PM TRAMADOL: NEGATIVE NG/ML

## 2023-04-06 ENCOUNTER — APPOINTMENT (OUTPATIENT)
Dept: PAIN MANAGEMENT | Facility: CLINIC | Age: 58
End: 2023-04-06
Payer: MEDICAID

## 2023-04-06 VITALS — HEIGHT: 71 IN | BODY MASS INDEX: 24.5 KG/M2 | WEIGHT: 175 LBS

## 2023-04-06 PROCEDURE — 99213 OFFICE O/P EST LOW 20 MIN: CPT

## 2023-04-06 NOTE — PHYSICAL EXAM
[Normal Coordination] : normal coordination [Normal DTR UE/LE] : normal DTR UE/LE  [Normal Sensation] : normal sensation [Normal Mood and Affect] : normal mood and affect [Orientated] : orientated [Able to Communicate] : able to communicate [Well Developed] : well developed [Well Nourished] : well nourished [Peripheral vascular exam is grossly normal] : peripheral vascular exam is grossly normal [No Respiratory Distress] : no respiratory distress [de-identified] : Palpation of the thoracic/lumbar spine is as follows: bilateral lumbar paraspinal tenderness. Range of motion of the thoracic and lumbar spine is as follows: Diminished range of motion in all planes. Gait and function is as follows: mildly antalgic gait.

## 2023-04-06 NOTE — ASSESSMENT
[FreeTextEntry1] : 58 year old male with chronic pain. He will continue with a regimen of Morphine Sulfate ER 60mg BID and Percocet 10-325mg TID. He will f/u in 4 weeks for reevaluation.\par \par I have consulted the [facility] registry for the purpose of reviewing the patient's controlled substance.\par \par Overall there is at least a 30-50% reduction in pain with the prescribed analgesics. The patient denies any adverse side effects due to the medication (sleeping disturbance, constipation, sleepiness, hallucinations and/or urination problems). \par \par I personally reviewed with the PA, this patient's history and physical exam findings, as documented above. I have discussed the relevant areas of concern, having direct implications to the presenting problems and illnesses, and I have personally examined all pertinent and positive and negative findings, which impact on the prior pain management treatment.\par \par Jamshid Diaz PA-C\par Yesenia Brasher MD\par

## 2023-04-06 NOTE — HISTORY OF PRESENT ILLNESS
[FreeTextEntry1] : ORIGINAL PRESENTATION:   As a review, Mr. Bower is a 58-year-old gentleman who we have been following for many years for chronic lower back pain and lumbar radiculopathy. Over the years, he has undergone lumbar epidural steroid injections without significant relief. He is status post L4-5 fusion in 2012 and extension of his fusion in L4-5 in 2016. He still experiences chronic pain with intermittent symptoms of radiculopathy.\par \par ACTIVITIES: No change in pain with relaxation, bowel movements.\par \par PRIOR PAIN MEDICATIONS: Morphine, Oxycodone, Tramadol, Motrin, Naproxen, Baclofen.\par \par PRIOR PAIN TREATMENTS: No relief with injections, PT, exercise, TENS, heat/cold treatment. Moderate relief with surgery.\par \par TODAY: I had the pleasure of seeing Mr. Bower today in follow up. His previous history and physical findings have been reviewed.  \par \par He is under our care for chronic lumbar pain with radiculopathy which he is receiving continuing active treatment for.  His pain has been unchanged since his last visit with no acute exacerbations or flares. He is medically managed with MS Contin ER 60 mg BID and Percocet 10/325 t.i.d. p.r.n. which allows him to perform his ADLs without side effects. He wishes to hold off with a pain pump right now as he does not feel it is the safest alternative to Oral medication. He wishes to continue as is without change.\par \par UDS: repeated today, 3/1/23 consistent

## 2023-05-03 ENCOUNTER — APPOINTMENT (OUTPATIENT)
Dept: PAIN MANAGEMENT | Facility: CLINIC | Age: 58
End: 2023-05-03
Payer: MEDICAID

## 2023-05-03 VITALS — HEIGHT: 71 IN | BODY MASS INDEX: 24.5 KG/M2 | WEIGHT: 175 LBS

## 2023-05-03 PROCEDURE — 99213 OFFICE O/P EST LOW 20 MIN: CPT

## 2023-05-03 NOTE — ASSESSMENT
[FreeTextEntry1] : 58 year old male with chronic pain.  Symptoms remain unchanged/. He will continue on a regimen of Morphine Sulfate ER 60mg BID and Percocet 10-325mg TID. He will f/u in 4 weeks for reevaluation.\par

## 2023-05-03 NOTE — HISTORY OF PRESENT ILLNESS
[FreeTextEntry1] : ORIGINAL PRESENTATION:   As a review, Mr. Bower is a 58-year-old gentleman who we have been following for many years for chronic lower back pain and lumbar radiculopathy. Over the years, he has undergone lumbar epidural steroid injections without significant relief. He is status post L4-5 fusion in 2012 and extension of his fusion in L4-5 in 2016. He still experiences chronic pain with intermittent symptoms of radiculopathy.\par \par ACTIVITIES: No change in pain with relaxation, bowel movements.\par \par PRIOR PAIN MEDICATIONS: Morphine, Oxycodone, Tramadol, Motrin, Naproxen, Baclofen.\par \par PRIOR PAIN TREATMENTS: No relief with injections, PT, exercise, TENS, heat/cold treatment. Moderate relief with surgery.\par \par TODAY: I had the pleasure of seeing Mr. Bower today in follow up. His previous history and physical findings have been reviewed.  \par \par He is under our care for chronic lumbar pain with radiculopathy which he is receiving continuing active treatment for. His pain has been unchanged since his last visit with no acute exacerbations or flare ups. He is medically managed with MS Contin ER 60 mg BID and Percocet 10/325 t.i.d. p.r.n. which allows him to perform his ADLs without side effects. Patient has deferred a pain pump. He wishes to continue with his oral medication as is without change.\par \par UDS: 3/1/23 + Hydrocodone/Codeine likely due to false positive.

## 2023-05-03 NOTE — PHYSICAL EXAM
[Normal Coordination] : normal coordination [Normal DTR UE/LE] : normal DTR UE/LE  [Normal Sensation] : normal sensation [Normal Mood and Affect] : normal mood and affect [Orientated] : orientated [Able to Communicate] : able to communicate [Well Developed] : well developed [Well Nourished] : well nourished [Peripheral vascular exam is grossly normal] : peripheral vascular exam is grossly normal [No Respiratory Distress] : no respiratory distress [de-identified] : Palpation of the thoracic/lumbar spine is as follows: bilateral lumbar paraspinal tenderness. Range of motion of the thoracic and lumbar spine is as follows: Diminished range of motion in all planes. Gait and function is as follows: mildly antalgic gait.

## 2023-05-03 NOTE — DISCUSSION/SUMMARY
[de-identified] : I have consulted the  registry for the purpose of reviewing the patient's controlled substance.\par \par Overall there is at least a 30-50% reduction in pain with the prescribed analgesics. The patient denies any adverse side effects due to the medication (sleeping disturbance, constipation, sleepiness, hallucinations and/or urination problems). \par The patient will return to the office in 4 weeks time and is aware to contact me with any question or concerns in the interim.\par \par Nannette Conway PA-C \par Yesenia Brasher MD\par

## 2023-05-31 ENCOUNTER — APPOINTMENT (OUTPATIENT)
Dept: PAIN MANAGEMENT | Facility: CLINIC | Age: 58
End: 2023-05-31

## 2023-06-02 ENCOUNTER — APPOINTMENT (OUTPATIENT)
Dept: PAIN MANAGEMENT | Facility: CLINIC | Age: 58
End: 2023-06-02
Payer: MEDICAID

## 2023-06-02 VITALS — HEIGHT: 71 IN | WEIGHT: 175 LBS | BODY MASS INDEX: 24.5 KG/M2

## 2023-06-02 PROCEDURE — 99213 OFFICE O/P EST LOW 20 MIN: CPT

## 2023-06-02 NOTE — DISCUSSION/SUMMARY
[de-identified] : I have consulted the  registry for the purpose of reviewing the patient's controlled substance.\par \par Overall there is at least a 30-50% reduction in pain with the prescribed analgesics. The patient denies any adverse side effects due to the medication (sleeping disturbance, constipation, sleepiness, hallucinations and/or urination problems). \par The patient will return to the office in 4 weeks time and is aware to contact me with any question or concerns in the interim.\par \par Nannette Conway PA-C \par Yesenia Brasher MD\par

## 2023-06-02 NOTE — PHYSICAL EXAM
[Normal Coordination] : normal coordination [Normal DTR UE/LE] : normal DTR UE/LE  [Normal Sensation] : normal sensation [Normal Mood and Affect] : normal mood and affect [Orientated] : orientated [Able to Communicate] : able to communicate [Well Developed] : well developed [Well Nourished] : well nourished [Peripheral vascular exam is grossly normal] : peripheral vascular exam is grossly normal [No Respiratory Distress] : no respiratory distress [de-identified] : Palpation of the thoracic/lumbar spine is as follows: bilateral lumbar paraspinal tenderness. Range of motion of the thoracic and lumbar spine is as follows: Diminished range of motion in all planes. Gait and function is as follows: mildly antalgic gait.

## 2023-06-02 NOTE — ASSESSMENT
[FreeTextEntry1] : 58 year old male with chronic pain. I have discussed starting amitriptyline 25 mg 1-2 tabs in an effort to decrease his radiculopathy at nighttime. He will continue on a regimen of Morphine Sulfate ER 60mg BID and Percocet 10-325mg TID. He will f/u in 4 weeks for reevaluation.\par

## 2023-06-02 NOTE — HISTORY OF PRESENT ILLNESS
[FreeTextEntry1] : ORIGINAL PRESENTATION:   As a review, Mr. Bower is a 58-year-old gentleman who we have been following for many years for chronic lower back pain and lumbar radiculopathy. Over the years, he has undergone lumbar epidural steroid injections without significant relief. He is status post L4-5 fusion in 2012 and extension of his fusion in L4-5 in 2016. He still experiences chronic pain with intermittent symptoms of radiculopathy.\par \par ACTIVITIES: No change in pain with relaxation, bowel movements.\par \par PRIOR PAIN MEDICATIONS: Morphine, Oxycodone, Tramadol, Motrin, Naproxen, Baclofen.\par \par PRIOR PAIN TREATMENTS: No relief with injections, PT, exercise, TENS, heat/cold treatment. Moderate relief with surgery.\par \par TODAY: I had the pleasure of seeing Mr. Bower today in follow up. His previous history and physical findings have been reviewed.  \par \par He is under our care for chronic lumbar pain with radiculopathy which he is receiving continuing active treatment for. Patient states his radiculopathy is waking him up at nighttime. He is medically managed with MS Contin ER 60 mg BID and Percocet 10/325 t.i.d. p.r.n. which allows him to perform his ADLs without side effects. Patient has deferred a pain pump. \par \par UDS: 3/1/23 + Hydrocodone/Codeine likely due to false positive.

## 2023-06-30 ENCOUNTER — LABORATORY RESULT (OUTPATIENT)
Age: 58
End: 2023-06-30

## 2023-06-30 ENCOUNTER — APPOINTMENT (OUTPATIENT)
Dept: PAIN MANAGEMENT | Facility: CLINIC | Age: 58
End: 2023-06-30
Payer: MEDICAID

## 2023-06-30 VITALS — WEIGHT: 175 LBS | BODY MASS INDEX: 24.5 KG/M2 | HEIGHT: 71 IN

## 2023-06-30 LAB
AMP / AMPHETAMINE: NEGATIVE
BAR / SECOBARBITAL: NEGATIVE
BUP / BUPRENORPHINE: NEGATIVE
BZO / OXAZEPAM: NEGATIVE
COC / COCAINE: NEGATIVE
CREATININE: 40 MG/DL
MDMA / METHYLENEDIOXYMETHAMPHETAMINE: NEGATIVE
MET / METHAMPHETAMINES: NEGATIVE
MOP / MORPHINE: POSITIVE
MTD / METHADONE: NEGATIVE
OXY / OXYCODONE: POSITIVE
PCP / PHENCYCLIDINE: NEGATIVE
PH: 6
SPECIFIC GRAVITY: 1.02
TEMPERATURE: 92 C
THC / MARIJUANA: NEGATIVE

## 2023-06-30 PROCEDURE — 99214 OFFICE O/P EST MOD 30 MIN: CPT

## 2023-06-30 PROCEDURE — 80305 DRUG TEST PRSMV DIR OPT OBS: CPT | Mod: QW

## 2023-06-30 RX ORDER — AMITRIPTYLINE HYDROCHLORIDE 25 MG/1
25 TABLET, FILM COATED ORAL
Qty: 60 | Refills: 3 | Status: COMPLETED | COMMUNITY
Start: 2023-06-02 | End: 2023-06-30

## 2023-06-30 NOTE — DISCUSSION/SUMMARY
[de-identified] : I have consulted the  registry for the purpose of reviewing the patient's controlled substance.\par \par Overall there is at least a 30-50% reduction in pain with the prescribed analgesics. The patient denies any adverse side effects due to the medication (sleeping disturbance, constipation, sleepiness, hallucinations and/or urination problems). \par Urine drug screening collected today with rapid sample result consistent with given regimen. Sample to be sent for confirmatory testing.\par The patient will return to the office in 4 weeks time and is aware to contact me with any question or concerns in the interim.\par \par Nannette Conway PA-C \par Yesenia Brasher MD\par

## 2023-06-30 NOTE — PHYSICAL EXAM
[Normal Coordination] : normal coordination [Normal DTR UE/LE] : normal DTR UE/LE  [Normal Sensation] : normal sensation [Normal Mood and Affect] : normal mood and affect [Orientated] : orientated [Able to Communicate] : able to communicate [Well Developed] : well developed [Well Nourished] : well nourished [Peripheral vascular exam is grossly normal] : peripheral vascular exam is grossly normal [No Respiratory Distress] : no respiratory distress [de-identified] : Palpation of the thoracic/lumbar spine is as follows: bilateral lumbar paraspinal tenderness. Range of motion of the thoracic and lumbar spine is as follows: Diminished range of motion in all planes. Gait and function is as follows: mildly antalgic gait.

## 2023-06-30 NOTE — ASSESSMENT
[FreeTextEntry1] : 58 year old male with chronic pain. I have switched him to nortriptyline 10 mg 1-2 tabs at nighttime. He will continue on a regimen of Morphine Sulfate ER and Percocet. I am increasing his Percocet back to 10-325mg TID. He will f/u in 4 weeks for reevaluation.\par

## 2023-06-30 NOTE — HISTORY OF PRESENT ILLNESS
[FreeTextEntry1] : ORIGINAL PRESENTATION:   As a review, Mr. Bower is a 58-year-old gentleman who we have been following for many years for chronic lower back pain and lumbar radiculopathy. Over the years, he has undergone lumbar epidural steroid injections without significant relief. He is status post L4-5 fusion in 2012 and extension of his fusion in L4-5 in 2016. He still experiences chronic pain with intermittent symptoms of radiculopathy.\par \par ACTIVITIES: No change in pain with relaxation, bowel movements.\par \par PRIOR PAIN MEDICATIONS: Morphine, Oxycodone, Tramadol, Motrin, Naproxen, Baclofen.\par \par PRIOR PAIN TREATMENTS: No relief with injections, PT, exercise, TENS, heat/cold treatment. Moderate relief with surgery.\par \par TODAY: I had the pleasure of seeing Mr. Bower today in follow up. His previous history and physical findings have been reviewed.  \par \par He is under our care for chronic lumbar pain with radiculopathy which he is receiving continuing active treatment for. Patient states his radiculopathy is waking him up at nighttime. He is medically managed with MS Contin ER 60 mg BID. His Percocet 10/325 mg was decreased to 7.5 mg since the 10 mg was on back order last month. Because of the decrease, his pain has intensified.\par \par He was prescribed amitriptyline on his last visit, which was helping him sleep; however, developed a rash from it. We discussed trialing nortriptyline instead which he is agreeable with.\par \par UDS: repeated today, 6/30/23 - see separate note.

## 2023-07-06 LAB
PM 6 MAM: NEGATIVE NG/ML
PM 7-AMINO-CLONAZ: NEGATIVE NG/ML
PM ALPHA-HYDROXY-ALPRAZOLAM: NEGATIVE NG/ML
PM ALPHA-HYDROXY-MIDAZOLAM: NEGATIVE NG/ML
PM ALPRAZOLAM: NEGATIVE NG/ML
PM AMOBARBITAL: NEGATIVE NG/ML
PM AMPHETAMINE INTERP: NEGATIVE
PM AMPHETAMINE: NEGATIVE NG/ML
PM BARBURATES INTERP: NEGATIVE
PM BEG: NEGATIVE NG/ML
PM BENZODIAZEPINES INTERP: NEGATIVE
PM BUPRENORPHINE INTERP: NEGATIVE
PM BUPRENORPHINE: NEGATIVE NG/ML
PM BUTALBITAL: NEGATIVE NG/ML
PM CLONAZEPAM: NEGATIVE NG/ML
PM COCAINE INTERP: NEGATIVE
PM COCAINE: NEGATIVE NG/ML
PM CODIENE: NEGATIVE NG/ML
PM COTININE: NEGATIVE NG/ML
PM DIAZEPAM: NEGATIVE NG/ML
PM DIHYROCODEINE: NEGATIVE NG/ML
PM EDDP: NEGATIVE NG/ML
PM FENTANYL INTERP: NEGATIVE NG/ML
PM FENTANYL: NEGATIVE NG/ML
PM FLUNITRAZEPAM: NEGATIVE NG/ML
PM FLURAZEPAM: NEGATIVE NG/ML
PM HYDROCODONE: NEGATIVE NG/ML
PM HYDROMORPHONE: 138 NG/ML
PM LORAZEPAM: NEGATIVE NG/ML
PM MARIJUANA (DELTA-9-THC): NEGATIVE NG/ML
PM MARIJUANA INTERP: NEGATIVE
PM MDA: NEGATIVE NG/ML
PM MDEA: NEGATIVE NG/ML
PM MDMA: NEGATIVE NG/ML
PM MEPERIDINE: NEGATIVE NG/ML
PM METHADONE INTERP: NEGATIVE
PM METHADONE: NEGATIVE NG/ML
PM METHAMPHETAMINE: NEGATIVE NG/ML
PM MIDAZOLAM: NEGATIVE NG/ML
PM MORPHINE: >1000 NG/ML
PM NALOXONE: NEGATIVE NG/ML
PM NALTREXONE: NEGATIVE NG/ML
PM NICOTINE INTERP: NEGATIVE
PM NORBUPRENORPHINE: NEGATIVE NG/ML
PM NORDIAZEPAM: NEGATIVE NG/ML
PM NORMEPERIDINE: NEGATIVE NG/ML
PM NOROXYCODONE: >1000 NG/ML
PM OPIOID INTERP: POSITIVE
PM OXAZEPAM: NEGATIVE NG/ML
PM OXXYCODONE INTERP: POSITIVE
PM OXYCODONE: 991 NG/ML
PM OXYMORPHONE: 319 NG/ML
PM PCP: NEGATIVE NG/ML
PM PHENCYCLIDINE INTERP: NEGATIVE
PM PHENOBARBITAL: NEGATIVE NG/ML
PM PPX: NEGATIVE NG/ML
PM PROPOXYPHENE INTERP: NEGATIVE
PM SECOBARBITAL: NEGATIVE NG/ML
PM SUFENTANIL: NEGATIVE NG/ML
PM TAPENTADOL: NEGATIVE NG/ML
PM TEMAZEPAM: NEGATIVE NG/ML
PM TRAMADOL INTERP: NEGATIVE
PM TRAMADOL: NEGATIVE NG/ML

## 2023-07-26 ENCOUNTER — APPOINTMENT (OUTPATIENT)
Dept: PAIN MANAGEMENT | Facility: CLINIC | Age: 58
End: 2023-07-26
Payer: MEDICAID

## 2023-07-26 VITALS — WEIGHT: 175 LBS | HEIGHT: 71 IN | BODY MASS INDEX: 24.5 KG/M2

## 2023-07-26 PROCEDURE — 99213 OFFICE O/P EST LOW 20 MIN: CPT

## 2023-07-26 RX ORDER — OXYCODONE AND ACETAMINOPHEN 7.5; 325 MG/1; MG/1
7.5-325 TABLET ORAL 3 TIMES DAILY
Qty: 90 | Refills: 0 | Status: DISCONTINUED | COMMUNITY
Start: 2023-06-02 | End: 2023-07-26

## 2023-07-26 RX ORDER — NORTRIPTYLINE HYDROCHLORIDE 10 MG/1
10 CAPSULE ORAL
Qty: 60 | Refills: 1 | Status: DISCONTINUED | COMMUNITY
Start: 2023-06-30 | End: 2023-07-26

## 2023-07-26 NOTE — ASSESSMENT
[FreeTextEntry1] : 58 year old male with chronic pain. He will continue on a regimen of Morphine Sulfate ER and Percocet. I am increasing his Percocet back to 10-325mg TID. He wishes to continue with amitriptyline, which was refilled for him. He will f/u in 4 weeks for reevaluation.\par

## 2023-07-26 NOTE — DISCUSSION/SUMMARY
[de-identified] : I have consulted the  registry for the purpose of reviewing the patient's controlled substance.\par \par Overall there is at least a 30-50% reduction in pain with the prescribed analgesics. The patient denies any adverse side effects due to the medication (sleeping disturbance, constipation, sleepiness, hallucinations and/or urination problems). \par There are no inconsistencies on urine toxicology screening which would necessitate an alteration of therapy.\par The patient will return to the office in 4 weeks time and is aware to contact me with any question or concerns in the interim.\par \par Nannette Conway PA-C \par Yesenia Brasher MD\par

## 2023-07-26 NOTE — HISTORY OF PRESENT ILLNESS
[FreeTextEntry1] : ORIGINAL PRESENTATION:   As a review, Mr. Bower is a 58-year-old gentleman who we have been following for many years for chronic lower back pain and lumbar radiculopathy. Over the years, he has undergone lumbar epidural steroid injections without significant relief. He is status post L4-5 fusion in 2012 and extension of his fusion in L4-5 in 2016. He still experiences chronic pain with intermittent symptoms of radiculopathy.\par \par ACTIVITIES: No change in pain with relaxation, bowel movements.\par \par PRIOR PAIN MEDICATIONS: Morphine, Oxycodone, Tramadol, Motrin, Naproxen, Baclofen.\par \par PRIOR PAIN TREATMENTS: No relief with injections, PT, exercise, TENS, heat/cold treatment. Moderate relief with surgery.\par \par TODAY: I had the pleasure of seeing Mr. Bower today in follow up. His previous history and physical findings have been reviewed.  \par \par He is under our care for chronic lumbar pain with radiculopathy which he is receiving continuing active treatment for. Patient states his radiculopathy is waking him up at nighttime. He trialed nortriptyline, which did not help him and therefore went back to taking amitriptyline without any side effects from it this time. He has been taking 1-2 tabs at nighttime. He continues with MS Contin ER 60 mg BID and Percocet 10/325 mg TID.\par \par UDS: 6/30/23 - Consistent.

## 2023-07-26 NOTE — PHYSICAL EXAM
[Normal Coordination] : normal coordination [Normal DTR UE/LE] : normal DTR UE/LE  [Normal Sensation] : normal sensation [Normal Mood and Affect] : normal mood and affect [Orientated] : orientated [Able to Communicate] : able to communicate [Well Developed] : well developed [Well Nourished] : well nourished [Peripheral vascular exam is grossly normal] : peripheral vascular exam is grossly normal [No Respiratory Distress] : no respiratory distress [de-identified] : Palpation of the thoracic/lumbar spine is as follows: bilateral lumbar paraspinal tenderness. Range of motion of the thoracic and lumbar spine is as follows: Diminished range of motion in all planes. Gait and function is as follows: mildly antalgic gait.

## 2023-08-25 ENCOUNTER — APPOINTMENT (OUTPATIENT)
Dept: PAIN MANAGEMENT | Facility: CLINIC | Age: 58
End: 2023-08-25
Payer: MEDICAID

## 2023-08-25 VITALS — HEIGHT: 71 IN | BODY MASS INDEX: 24.5 KG/M2 | WEIGHT: 175 LBS

## 2023-08-25 PROCEDURE — 99213 OFFICE O/P EST LOW 20 MIN: CPT

## 2023-08-25 NOTE — PHYSICAL EXAM
[Normal Coordination] : normal coordination [Normal DTR UE/LE] : normal DTR UE/LE  [Normal Sensation] : normal sensation [Normal Mood and Affect] : normal mood and affect [Orientated] : orientated [Able to Communicate] : able to communicate [Well Developed] : well developed [Well Nourished] : well nourished [Peripheral vascular exam is grossly normal] : peripheral vascular exam is grossly normal [No Respiratory Distress] : no respiratory distress [de-identified] : Palpation of the thoracic/lumbar spine is as follows: bilateral lumbar paraspinal tenderness. Range of motion of the thoracic and lumbar spine is as follows: Diminished range of motion in all planes. Gait and function is as follows: mildly antalgic gait.

## 2023-08-25 NOTE — DISCUSSION/SUMMARY
[de-identified] : I have consulted the  registry for the purpose of reviewing the patient's controlled substance.\par  \par  Overall there is at least a 30-50% reduction in pain with the prescribed analgesics. The patient denies any adverse side effects due to the medication (sleeping disturbance, constipation, sleepiness, hallucinations and/or urination problems). \par  There are no inconsistencies on urine toxicology screening which would necessitate an alteration of therapy.\par  The patient will return to the office in 4 weeks time and is aware to contact me with any question or concerns in the interim.\par  \par  Nannette Conway PA-C \par  Yesenia Brasher MD\par

## 2023-08-25 NOTE — ASSESSMENT
[FreeTextEntry1] : 58 year old male with chronic pain. He will continue on a regimen of Morphine Sulfate ER, Percocet and amitriptyline. He will f/u in 4 weeks for reevaluation.

## 2023-08-25 NOTE — HISTORY OF PRESENT ILLNESS
[FreeTextEntry1] : ORIGINAL PRESENTATION:   As a review, Mr. Bower is a 58-year-old gentleman who we have been following for many years for chronic lower back pain and lumbar radiculopathy. Over the years, he has undergone lumbar epidural steroid injections without significant relief. He is status post L4-5 fusion in 2012 and extension of his fusion in L4-5 in 2016. He still experiences chronic pain with intermittent symptoms of radiculopathy.  ACTIVITIES: No change in pain with relaxation, bowel movements.  PRIOR PAIN MEDICATIONS: Morphine, Oxycodone, Tramadol, Motrin, Naproxen, Baclofen.  PRIOR PAIN TREATMENTS: No relief with injections, PT, exercise, TENS, heat/cold treatment. Moderate relief with surgery.  TODAY: I had the pleasure of seeing Mr. Bower today in follow up. His previous history and physical findings have been reviewed.    He is under our care for chronic lumbar pain with radiculopathy which he is receiving continuing active treatment for. Patient states his radiculopathy has improved. He has been taking amitriptyline 1-2 tabs at nighttime without any side effects currently. He continues with MS Contin ER 60 mg BID and Percocet 10/325 mg TID. He wishes to continue as is.  UDS: 6/30/23 - Consistent.

## 2023-09-20 ENCOUNTER — APPOINTMENT (OUTPATIENT)
Dept: PAIN MANAGEMENT | Facility: CLINIC | Age: 58
End: 2023-09-20
Payer: MEDICAID

## 2023-09-20 VITALS
BODY MASS INDEX: 24.5 KG/M2 | SYSTOLIC BLOOD PRESSURE: 166 MMHG | HEART RATE: 48 BPM | DIASTOLIC BLOOD PRESSURE: 89 MMHG | HEIGHT: 71 IN | WEIGHT: 175 LBS

## 2023-09-20 PROCEDURE — 99214 OFFICE O/P EST MOD 30 MIN: CPT

## 2023-10-18 ENCOUNTER — APPOINTMENT (OUTPATIENT)
Dept: PAIN MANAGEMENT | Facility: CLINIC | Age: 58
End: 2023-10-18
Payer: MEDICAID

## 2023-10-18 PROCEDURE — 99213 OFFICE O/P EST LOW 20 MIN: CPT

## 2023-11-15 ENCOUNTER — NON-APPOINTMENT (OUTPATIENT)
Age: 58
End: 2023-11-15

## 2023-11-15 ENCOUNTER — APPOINTMENT (OUTPATIENT)
Dept: PAIN MANAGEMENT | Facility: CLINIC | Age: 58
End: 2023-11-15

## 2023-11-16 ENCOUNTER — EMERGENCY (EMERGENCY)
Facility: HOSPITAL | Age: 58
LOS: 0 days | Discharge: HOME | End: 2023-11-16
Attending: EMERGENCY MEDICINE
Payer: MEDICAID

## 2023-11-16 VITALS
DIASTOLIC BLOOD PRESSURE: 94 MMHG | TEMPERATURE: 96 F | SYSTOLIC BLOOD PRESSURE: 130 MMHG | HEART RATE: 77 BPM | RESPIRATION RATE: 18 BRPM | OXYGEN SATURATION: 100 %

## 2023-11-16 DIAGNOSIS — K59.00 CONSTIPATION, UNSPECIFIED: ICD-10-CM

## 2023-11-16 DIAGNOSIS — R07.81 PLEURODYNIA: ICD-10-CM

## 2023-11-16 DIAGNOSIS — Z98.1 ARTHRODESIS STATUS: ICD-10-CM

## 2023-11-16 DIAGNOSIS — Z98.890 OTHER SPECIFIED POSTPROCEDURAL STATES: Chronic | ICD-10-CM

## 2023-11-16 LAB
APPEARANCE UR: CLEAR — SIGNIFICANT CHANGE UP
APPEARANCE UR: CLEAR — SIGNIFICANT CHANGE UP
BILIRUB UR-MCNC: NEGATIVE — SIGNIFICANT CHANGE UP
BILIRUB UR-MCNC: NEGATIVE — SIGNIFICANT CHANGE UP
COLOR SPEC: YELLOW — SIGNIFICANT CHANGE UP
COLOR SPEC: YELLOW — SIGNIFICANT CHANGE UP
DIFF PNL FLD: NEGATIVE — SIGNIFICANT CHANGE UP
DIFF PNL FLD: NEGATIVE — SIGNIFICANT CHANGE UP
GLUCOSE UR QL: NEGATIVE MG/DL — SIGNIFICANT CHANGE UP
GLUCOSE UR QL: NEGATIVE MG/DL — SIGNIFICANT CHANGE UP
KETONES UR-MCNC: NEGATIVE MG/DL — SIGNIFICANT CHANGE UP
KETONES UR-MCNC: NEGATIVE MG/DL — SIGNIFICANT CHANGE UP
LEUKOCYTE ESTERASE UR-ACNC: NEGATIVE — SIGNIFICANT CHANGE UP
LEUKOCYTE ESTERASE UR-ACNC: NEGATIVE — SIGNIFICANT CHANGE UP
NITRITE UR-MCNC: NEGATIVE — SIGNIFICANT CHANGE UP
NITRITE UR-MCNC: NEGATIVE — SIGNIFICANT CHANGE UP
PH UR: 5.5 — SIGNIFICANT CHANGE UP (ref 5–8)
PH UR: 5.5 — SIGNIFICANT CHANGE UP (ref 5–8)
PROT UR-MCNC: NEGATIVE MG/DL — SIGNIFICANT CHANGE UP
PROT UR-MCNC: NEGATIVE MG/DL — SIGNIFICANT CHANGE UP
SP GR SPEC: 1.02 — SIGNIFICANT CHANGE UP (ref 1–1.03)
SP GR SPEC: 1.02 — SIGNIFICANT CHANGE UP (ref 1–1.03)
UROBILINOGEN FLD QL: 0.2 MG/DL — SIGNIFICANT CHANGE UP (ref 0.2–1)
UROBILINOGEN FLD QL: 0.2 MG/DL — SIGNIFICANT CHANGE UP (ref 0.2–1)

## 2023-11-16 PROCEDURE — 81003 URINALYSIS AUTO W/O SCOPE: CPT

## 2023-11-16 PROCEDURE — 99283 EMERGENCY DEPT VISIT LOW MDM: CPT

## 2023-11-16 NOTE — ED PROVIDER NOTE - ATTENDING APP SHARED VISIT CONTRIBUTION OF CARE
58-year-old male above past medical history referred from urgent care for evaluation to rule out "blockage," had left lower rib pain similar to prior broken rib pain though denies trauma or precipitating event, pain is worse with movement, no shortness of breath, no abdominal pain, no nausea vomiting or diarrhea, is on chronic opiates and takes laxatives for his constipation, reports no change in bowel habits, no fever, symptoms are actually improving but brought to urgent care by his wife where he had EKG and chest abdominal x-rays (reviewed by me, EKG normal and imaging with large stool burden but no air-fluid levels and no free air), on exam vitals appreciated, well-appearing, cor regular, lungs clear, abdomen soft nontender no hepatosplenomegaly, does have some tenderness to the left lower anterolateral chest wall exactly reproducing pain, no rash though does have erythema ab igne across back, calves nontender, pulses equal, no clubbing cyanosis or edema, urinalysis without blood, likely musculoskeletal, will discharge to continue current management and follow-up with PMD.  Patient counseled regarding conditions which should prompt return.

## 2023-11-16 NOTE — ED PROVIDER NOTE - PATIENT PORTAL LINK FT
You can access the FollowMyHealth Patient Portal offered by [facility] by registering at the following website: [email or web address]. By joining [employer]’s FollowMyHealth portal, you will also be able to view your health information using other applications (apps) compatible with our system.

## 2023-11-16 NOTE — ED PROVIDER NOTE - PHYSICAL EXAMINATION
general: well appearing, comfortable  eyes: clear conjunctiva  cardiac: RRR S1S2  resp: CTA, +ttp left lateral chest wall   abd: non tender, non distended, BS x 4, no CVA tenderness  msk: neck supple, pelvis stable, neg SLR, no sciatic notch tenderness, no foot drop, no vertebral tenderness, flexion / extension lumbar region in tact  skin: erythema ab igne on back   neuro: alert, oriented , no motor or sensory deficits , gait steady

## 2023-11-16 NOTE — ED PROVIDER NOTE - OBJECTIVE STATEMENT
57 y/o male with chronic back pain s/p disk fusions, ambulates with a cane presents to the ED with left lower rib pain worse with movement over past few days. no hemoptysis, productive cough, fevers. no chest pain or palpitations. no rash. patient denies any dysuria or gross hematuria. no abdominal distention or vomiting. patient with hx of chronic constipation secondary to opiate usage. patient eval at [facility], had xrays and ekg preformed and sent to the ED

## 2023-12-13 ENCOUNTER — APPOINTMENT (OUTPATIENT)
Dept: PAIN MANAGEMENT | Facility: CLINIC | Age: 58
End: 2023-12-13
Payer: MEDICAID

## 2023-12-13 VITALS — WEIGHT: 175 LBS | HEIGHT: 71 IN | BODY MASS INDEX: 24.5 KG/M2

## 2023-12-13 DIAGNOSIS — M51.17 INTERVERTEBRAL DISC DISORDERS WITH RADICULOPATHY, LUMBOSACRAL REGION: ICD-10-CM

## 2023-12-13 DIAGNOSIS — M51.26 OTHER INTERVERTEBRAL DISC DISPLACEMENT, LUMBAR REGION: ICD-10-CM

## 2023-12-13 DIAGNOSIS — Z79.899 OTHER LONG TERM (CURRENT) DRUG THERAPY: ICD-10-CM

## 2023-12-13 PROCEDURE — 99214 OFFICE O/P EST MOD 30 MIN: CPT

## 2023-12-13 RX ORDER — OXYCODONE AND ACETAMINOPHEN 10; 325 MG/1; MG/1
10-325 TABLET ORAL
Qty: 90 | Refills: 0 | Status: ACTIVE | COMMUNITY
Start: 2022-07-11 | End: 1900-01-01

## 2023-12-13 RX ORDER — MORPHINE SULFATE 60 MG/1
60 TABLET, FILM COATED, EXTENDED RELEASE ORAL TWICE DAILY
Qty: 60 | Refills: 0 | Status: ACTIVE | COMMUNITY
Start: 2022-07-11 | End: 1900-01-01

## 2023-12-13 RX ORDER — AMITRIPTYLINE HYDROCHLORIDE 25 MG/1
25 TABLET, FILM COATED ORAL
Qty: 60 | Refills: 0 | Status: ACTIVE | COMMUNITY
Start: 2023-07-26 | End: 1900-01-01

## 2023-12-13 NOTE — DISCUSSION/SUMMARY
[de-identified] : 58 year old male with chronic pain. Patient does not want to decrease his narcotic regimen. I have refilled his Morphine Sulfate ER, Percocet and amitriptyline for this month. He will follow up with another pain specialist going forward.   I have consulted the  registry for the purpose of reviewing the patient's controlled substance.  Overall there is at least a 30-50% reduction in pain with the prescribed analgesics. The patient denies any adverse side effects due to the medication (sleeping disturbance, constipation, sleepiness, hallucinations and/or urination problems).  There are no inconsistencies on urine toxicology screening which would necessitate an alteration of therapy.  DONNELL Rosenthal MD

## 2023-12-13 NOTE — DATA REVIEWED
[FreeTextEntry1] : MRI of the lumbar spine with and without contrast dated January 30, 2019, shows status post bilateral laminectomy at L5-S1 with posterior fusion at the L4-5, L5-S1 levels. Mild grade 1 retrolisthesis at L3-4, degenerative disc changes in the lumbar spine superimposed on congenital spinal canal stenosis, L2-3, L3-4 mild flattening at the ventral margin of the thecal sac with mild bilateral foraminal narrowing at the disc space level due to mild disc bulges.  SOAPP: LOW RISK. Low risk: Patient has combination of a low risk SOAP and no risk factors. UDS would be repeated randomly every quarter.  [place] REGISTRY: Checked

## 2023-12-13 NOTE — PHYSICAL EXAM
[Normal Coordination] : normal coordination [Normal DTR UE/LE] : normal DTR UE/LE  [Normal Sensation] : normal sensation [Normal Mood and Affect] : normal mood and affect [Orientated] : orientated [Able to Communicate] : able to communicate [Well Developed] : well developed [Well Nourished] : well nourished [Peripheral vascular exam is grossly normal] : peripheral vascular exam is grossly normal [No Respiratory Distress] : no respiratory distress [de-identified] : Palpation of the thoracic/lumbar spine is as follows: bilateral lumbar paraspinal tenderness. Range of motion of the thoracic and lumbar spine is as follows: Diminished range of motion in all planes. Gait and function is as follows: mildly antalgic gait.

## 2023-12-13 NOTE — HISTORY OF PRESENT ILLNESS
[FreeTextEntry1] : ORIGINAL PRESENTATION:  As a review, Mr. Bower is a 58-year-old gentleman who we have been following for many years for chronic lower back pain and lumbar radiculopathy. Over the years, he has undergone lumbar epidural steroid injections without significant relief. He is status post L4-5 fusion in 2012 and extension of his fusion in L4-5 in 2016. He still experiences chronic pain with intermittent symptoms of radiculopathy.  ACTIVITIES: No change in pain with relaxation, bowel movements.  PRIOR PAIN MEDICATIONS: Morphine, Oxycodone, Tramadol, Motrin, Naproxen, Baclofen.  PRIOR PAIN TREATMENTS: No relief with injections, PT, exercise, TENS, heat/cold treatment. Moderate relief with surgery.  TODAY: I had the pleasure of seeing Mr. Bower today in follow up. His previous history and physical findings have been reviewed.    He is under our care for chronic lumbar pain with radiculopathy which he is receiving continuing active treatment for. Pain gets worse with activity. Patient states his radiculopathy comes and goes. He continues to take amitriptyline 1-2 tabs at nighttime which helps him sleep. He denies side effects from the medication. He also continues with MS Contin ER 60 mg BID and Percocet 10/325 mg TID. Current pain scale is 7/10. Today, we discussed decreasing his narcotic regimen, which he objects to. Instead, he wishes to seek care elsewhere.  UDS: 6/30/23 - Consistent. SOAPP, 10/18/23 - LOW RISK.

## 2024-03-06 ENCOUNTER — APPOINTMENT (OUTPATIENT)
Dept: PAIN MANAGEMENT | Facility: CLINIC | Age: 59
End: 2024-03-06
Payer: MEDICAID

## 2024-03-06 ENCOUNTER — EMERGENCY (EMERGENCY)
Facility: HOSPITAL | Age: 59
LOS: 0 days | Discharge: HOME | End: 2024-03-06
Attending: STUDENT IN AN ORGANIZED HEALTH CARE EDUCATION/TRAINING PROGRAM
Payer: MEDICAID

## 2024-03-06 VITALS
HEIGHT: 71 IN | TEMPERATURE: 98 F | RESPIRATION RATE: 18 BRPM | WEIGHT: 179.9 LBS | HEART RATE: 53 BPM | OXYGEN SATURATION: 99 % | SYSTOLIC BLOOD PRESSURE: 143 MMHG | DIASTOLIC BLOOD PRESSURE: 84 MMHG

## 2024-03-06 DIAGNOSIS — G89.29 OTHER CHRONIC PAIN: ICD-10-CM

## 2024-03-06 DIAGNOSIS — M54.9 DORSALGIA, UNSPECIFIED: ICD-10-CM

## 2024-03-06 DIAGNOSIS — M54.16 RADICULOPATHY, LUMBAR REGION: ICD-10-CM
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
## 2024-03-06 DIAGNOSIS — T21.14XA BURN OF FIRST DEGREE OF LOWER BACK, INITIAL ENCOUNTER: ICD-10-CM

## 2024-03-06 DIAGNOSIS — Z98.1 ARTHRODESIS STATUS: ICD-10-CM

## 2024-03-06 DIAGNOSIS — Z98.890 OTHER SPECIFIED POSTPROCEDURAL STATES: Chronic | ICD-10-CM

## 2024-03-06 PROCEDURE — 96372 THER/PROPH/DIAG INJ SC/IM: CPT

## 2024-03-06 PROCEDURE — 99213 OFFICE O/P EST LOW 20 MIN: CPT

## 2024-03-06 PROCEDURE — 99284 EMERGENCY DEPT VISIT MOD MDM: CPT

## 2024-03-06 PROCEDURE — 99283 EMERGENCY DEPT VISIT LOW MDM: CPT | Mod: 25

## 2024-03-06 RX ORDER — OXYCODONE AND ACETAMINOPHEN 5; 325 MG/1; MG/1
1 TABLET ORAL ONCE
Refills: 0 | Status: DISCONTINUED | OUTPATIENT
Start: 2024-03-06 | End: 2024-03-06

## 2024-03-06 RX ORDER — KETOROLAC TROMETHAMINE 30 MG/ML
30 SYRINGE (ML) INJECTION ONCE
Refills: 0 | Status: DISCONTINUED | OUTPATIENT
Start: 2024-03-06 | End: 2024-03-06

## 2024-03-06 RX ADMIN — Medication 30 MILLIGRAM(S): at 14:46

## 2024-03-06 NOTE — HISTORY OF PRESENT ILLNESS
[FreeTextEntry1] : ORIGINAL PRESENTATION:  As a review, Mr. Bower is a 59-year-old gentleman who we have been following for many years for chronic lower back pain and lumbar radiculopathy. Over the years, he has undergone lumbar epidural steroid injections without significant relief. He is status post L4-5 fusion in 2012 and extension of his fusion in L4-5 in 2016. He still experiences chronic pain with intermittent symptoms of radiculopathy.  ACTIVITIES: No change in pain with relaxation, bowel movements.  PRIOR PAIN MEDICATIONS: Morphine, Oxycodone, Tramadol, Motrin, Naproxen, Baclofen.  PRIOR PAIN TREATMENTS: No relief with injections, PT, exercise, TENS, heat/cold treatment. Moderate relief with surgery.  PATIENT PRESENTS FOR FOLLOW UP: He is under our care for chronic lumbar pain with radiculopathy which he is receiving continuing active treatment for. Pain gets worse with activity. Patient states his radiculopathy comes and goes. He continues to take amitriptyline 1-2 tabs at nighttime which helps him sleep. He denies side effects from the medication. He was previously on a regimen on MS Contin ER 60 mg BID and Percocet 10/325 mg TID. He since followed up with another pain provider who suggested he trial a SCS trial, but he is not interested. He returns to the office today asking to be restarted on his medication. I advised him that he was disengaged from the office at his last visit 2 months ago and our practice is no longer prescribing narcotic medication.

## 2024-03-06 NOTE — ED PROVIDER NOTE - CLINICAL SUMMARY MEDICAL DECISION MAKING FREE TEXT BOX
59-year-old male, past medical history of multiple spinal fusions following with pain management, presenting with worsening back pain.  He states that he was following with Dr. Brasher for a long time but is not having difficulty getting follow-up and therefore his prescription medications.  Denies any trauma, fevers, inability ambulate, weakness, numbness, urinary retention or incontinence.  No tenderness to back on palpation midline or paraspinal.  Noted to have erythema ab igne from heating pad use at home.  Able to ambulate independently with a cane as per baseline.  Discussed that we cannot give a prescription but will treat his pain here today.  We also will give him follow-up with pain management and neurosurgery through the referral coordinators.  Patient is better and is comfortable with plan.  Additional history obtained from partner at bedside.

## 2024-03-06 NOTE — ED PROVIDER NOTE - PHYSICAL EXAMINATION
Lumbar sacral spine.  There is no midline tenderness along the lumbosacral spine.  There is positive paraspinal tenderness to the right side.  Patient also has first-degree burn to right side from using hot compresses.

## 2024-03-06 NOTE — ED PROVIDER NOTE - PATIENT PORTAL LINK FT
You can access the FollowMyHealth Patient Portal offered by [facility] by registering at the following website: [email or web address]. By joining [employer]’s FollowMyHealth portal, you will also be able to view your health information using other applications (apps) compatible with our system.

## 2024-03-06 NOTE — PHYSICAL EXAM
[Normal Coordination] : normal coordination [Normal DTR UE/LE] : normal DTR UE/LE  [Normal Sensation] : normal sensation [Normal Mood and Affect] : normal mood and affect [Oriented] : oriented [Able to Communicate] : able to communicate [Well Developed] : well developed [Well Nourished] : well nourished [Peripheral vascular exam is grossly normal] : peripheral vascular exam is grossly normal [No Respiratory Distress] : no respiratory distress [de-identified] : Palpation of the thoracic/lumbar spine is as follows: bilateral lumbar paraspinal tenderness. Range of motion of the thoracic and lumbar spine is as follows: Diminished range of motion in all planes. Gait and function is as follows: mildly antalgic gait.

## 2024-03-06 NOTE — ED PROVIDER NOTE - PROGRESS NOTE DETAILS
Will provide patient with rapid referral.  Patient also advised to use caution when using heating pad

## 2024-03-06 NOTE — ASSESSMENT
[FreeTextEntry1] : This is a 59-year-old male with chronic pain. Patient does not want to decrease his narcotic regimen. He was previously medically managed on Morphine Sulfate ER, Percocet and amitriptyline. At his previous visit in December, he was disengaged from the office. I advised him that we no longer prescribe narcotics within our practice. He is understanding and notes some confusion when making the appointment. All this patients questions were answered and the conversation was understood well.  I, Rocio Graham, attest that this documentation has been prepared under the direction and in the presence of Provider Yesenia Brasher MD.   Thank you for allowing me to assist in the management of this patient.    Best Regards,    Yesenia Brasher M.D., FAAPMR   Diplomate, American Board of Physical Medicine and Rehabilitation Diplomate, American Board of Pain Medicine  Diplomate, American Board of Pain Management

## 2024-03-06 NOTE — REVIEW OF SYSTEMS
[Arthralgia] : arthralgia [Joint Pain] : joint pain [Chills] : no chills [Discharge] : no discharge [Decrease Hearing] : no decrease in hearing [Lower Ext Edema] : no lower extremity edema [Abdominal Pain] : no abdominal pain [SOB at rest] : no shortness of breath at rest

## 2024-03-06 NOTE — ED ADULT TRIAGE NOTE - CHIEF COMPLAINT QUOTE
Patient complaining of back pain. Recently had an issue with pain management doctor, cannot get pain medicine anymore

## 2024-03-06 NOTE — ED PROVIDER NOTE - OBJECTIVE STATEMENT
59-year-old male presents to the ED for evaluation of back pain.  Patient has been under the care of pain management for several years after spinal fusion.  Patient states he is having trouble finding a new pain management as his old pain management cannot see him anymore.

## 2024-03-06 NOTE — DATA REVIEWED
[FreeTextEntry1] : MRI of the lumbar spine with and without contrast dated January 30, 2019, shows status post bilateral laminectomy at L5-S1 with posterior fusion at the L4-5, L5-S1 levels. Mild grade 1 retrolisthesis at L3-4, degenerative disc changes in the lumbar spine superimposed on congenital spinal canal stenosis, L2-3, L3-4 mild flattening at the ventral margin of the thecal sac with mild bilateral foraminal narrowing at the disc space level due to mild disc bulges.  SOAPP: LOW RISK. Low risk: Patient has combination of a low risk SOAP and no risk factors. UDS would be repeated randomly every quarter.  UDS: 6/30/23, +MOP, OXY.  [place] REGISTRY: Checked

## 2024-03-06 NOTE — ED PROVIDER NOTE - NSPTACCESSSVCSAPPTDETAILS_ED_ALL_ED_FT
patient needs help getting into pain management. he was under care but they are not able to see him anymore.     patient can also use neurosurgical follow as his chronic back pain is getting worse

## 2024-03-06 NOTE — ED PROVIDER NOTE - NSFOLLOWUPINSTRUCTIONS_ED_ALL_ED_FT
Back Pain    Back pain is very common in adults. The cause of back pain is rarely dangerous and the pain often gets better over time. The cause of your back pain may not be known and may include strain of muscles or ligaments, degeneration of the spinal disks, or arthritis. Occasionally the pain may radiate down your leg(s). Over-the-counter medicines to reduce pain and inflammation are often the most helpful. Stretching and remaining active frequently helps the healing process.     SEEK IMMEDIATE MEDICAL CARE IF YOU HAVE ANY OF THE FOLLOWING SYMPTOMS: bowel or bladder control problems, unusual weakness or numbness in your arms or legs, nausea or vomiting, abdominal pain, fever, dizziness/lightheadedness.    Our Emergency Department Referral Coordinators will be reaching out to you in the next 24-48 hours from 9:00am to 5:00pm with a follow up appointment. Please expect a phone call from the hospital in that time frame. If you do not receive a call or if you have any questions or concerns, you can reach them at [phone] or [phone].

## 2024-03-06 NOTE — ED PROVIDER NOTE - TIMING
Billy Moore   8/16/2018 10:30 AM   Anticoagulation Therapy Visit    Description:  71 year old male   Provider:  [facility]   Department:  [facility]           INR as of 8/16/2018     Today's INR 2.3      Anticoagulation Summary as of 8/16/2018     INR goal 2.0-3.0   Today's INR 2.3   Full warfarin instructions 2.5 mg on Wed; 5 mg all other days   Next INR check 9/13/2018    Indications   Long-term (current) use of anticoagulants [Z79.01] [Z79.01]  Chronic atrial fibrillation (H) [I48.2]         Your next Anticoagulation Clinic appointment(s)     Sep 13, 2018 10:00 AM CDT   Anticoagulation Visit with [facility]   [facility]    [address] [facility] [address]  [facility] [address] [phone]              Contact Numbers     [facility] Phone Numbers:  Anticoagulation Clinic Appointments : [phone]  Anticoagulation Nurse: [phone]         August 2018 Details    Sun Mon Tue Wed Thu Fri Sat        1               2               3               4                 5               6               7               8               9               10               11                 12               13               14               15               16      5 mg   See details      17      5 mg         18      5 mg           19      5 mg         20      5 mg         21      5 mg         22      2.5 mg         23      5 mg         24      5 mg         25      5 mg           26      5 mg         27      5 mg         28      5 mg         29      2.5 mg         30      5 mg         31      5 mg           Date Details   08/16 This INR check               How to take your warfarin dose     To take:  2.5 mg Take 0.5 of a 5 mg tablet.    To take:  5 mg Take 1 of the 5 mg tablets.           September 2018 Details    Sun Mon Tue Wed Thu Fri Sat           1      5 mg           2      5 mg         3      5 mg         4      5 mg          5      2.5 mg         6      5 mg         7      5 mg         8      5 mg           9      5 mg         10      5 mg         11      5 mg         12      2.5 mg         13            14               15                 16               17               18               19               20               21               22                 23               24               25               26               27               28               29                 30                      Date Details   No additional details    Date of next INR:  9/13/2018         How to take your warfarin dose     To take:  2.5 mg Take 0.5 of a 5 mg tablet.    To take:  5 mg Take 1 of the 5 mg tablets.            gradual onset

## 2024-03-08 NOTE — CHART NOTE - NSCHARTNOTESELECT_GEN_ALL_CORE
non clinical appointment information/Event Note
non-clinical appt info/Event Note
non clinical appointment information/Event Note

## 2024-03-08 NOTE — CHART NOTE - NSCHARTNOTEFT_GEN_A_CORE
[facility] MRN [id number] busy signal 3/7 LW Patient stated theyre having a procedure next week and will call [facility] if they would like any follow ups 3/8 LW
[facility] MRN [id number] / Left message 3/7 - JL/ please schedule appt 3/6- AC  Appointment Scheduled 4/2 1:45 PM NIKKY BONILLA 3/8 DEBBIE
[facility] MRN [id number] / Left message 3/7 - JL/ sent to PM Chat 3/7- AC / Prior appt per pain management 3/7 - JL    Specialty: pain management

## 2024-04-16 NOTE — ED ADULT TRIAGE NOTE - AS HEIGHT TYPE
[de-identified] : stated

## 2024-06-09 ENCOUNTER — NON-APPOINTMENT (OUTPATIENT)
Age: 59
End: 2024-06-09

## 2024-06-10 ENCOUNTER — EMERGENCY (EMERGENCY)
Facility: HOSPITAL | Age: 59
LOS: 0 days | Discharge: HOME | End: 2024-06-10
Attending: EMERGENCY MEDICINE
Payer: MEDICAID

## 2024-06-10 VITALS
OXYGEN SATURATION: 96 % | HEART RATE: 57 BPM | HEIGHT: 71 IN | SYSTOLIC BLOOD PRESSURE: 162 MMHG | RESPIRATION RATE: 18 BRPM | DIASTOLIC BLOOD PRESSURE: 100 MMHG | TEMPERATURE: 98 F | WEIGHT: 184.09 LBS

## 2024-06-10 VITALS
RESPIRATION RATE: 18 BRPM | SYSTOLIC BLOOD PRESSURE: 160 MMHG | TEMPERATURE: 98 F | DIASTOLIC BLOOD PRESSURE: 93 MMHG | HEART RATE: 65 BPM | OXYGEN SATURATION: 96 %

## 2024-06-10 DIAGNOSIS — R10.9 UNSPECIFIED ABDOMINAL PAIN: ICD-10-CM

## 2024-06-10 DIAGNOSIS — R14.0 ABDOMINAL DISTENSION (GASEOUS): ICD-10-CM

## 2024-06-10 DIAGNOSIS — R11.0 NAUSEA: ICD-10-CM

## 2024-06-10 DIAGNOSIS — Z98.890 OTHER SPECIFIED POSTPROCEDURAL STATES: Chronic | ICD-10-CM

## 2024-06-10 DIAGNOSIS — Z98.1 ARTHRODESIS STATUS: ICD-10-CM

## 2024-06-10 LAB
ALBUMIN SERPL ELPH-MCNC: 4.5 G/DL — SIGNIFICANT CHANGE UP (ref 3.5–5.2)
ALP SERPL-CCNC: 89 U/L — SIGNIFICANT CHANGE UP (ref 30–115)
ALT FLD-CCNC: 30 U/L — SIGNIFICANT CHANGE UP (ref 0–41)
ANION GAP SERPL CALC-SCNC: 8 MMOL/L — SIGNIFICANT CHANGE UP (ref 7–14)
APTT BLD: 28 SEC — SIGNIFICANT CHANGE UP (ref 27–39.2)
AST SERPL-CCNC: 31 U/L — SIGNIFICANT CHANGE UP (ref 0–41)
BASOPHILS # BLD AUTO: 0 K/UL — SIGNIFICANT CHANGE UP (ref 0–0.2)
BASOPHILS NFR BLD AUTO: 0 % — SIGNIFICANT CHANGE UP (ref 0–1)
BILIRUB SERPL-MCNC: 0.3 MG/DL — SIGNIFICANT CHANGE UP (ref 0.2–1.2)
BLD GP AB SCN SERPL QL: SIGNIFICANT CHANGE UP
BUN SERPL-MCNC: 17 MG/DL — SIGNIFICANT CHANGE UP (ref 10–20)
CALCIUM SERPL-MCNC: 8.8 MG/DL — SIGNIFICANT CHANGE UP (ref 8.4–10.5)
CHLORIDE SERPL-SCNC: 102 MMOL/L — SIGNIFICANT CHANGE UP (ref 98–110)
CO2 SERPL-SCNC: 26 MMOL/L — SIGNIFICANT CHANGE UP (ref 17–32)
CREAT SERPL-MCNC: 0.8 MG/DL — SIGNIFICANT CHANGE UP (ref 0.7–1.5)
EGFR: 102 ML/MIN/1.73M2 — SIGNIFICANT CHANGE UP
EOSINOPHIL # BLD AUTO: 1.53 K/UL — HIGH (ref 0–0.7)
EOSINOPHIL NFR BLD AUTO: 21 % — HIGH (ref 0–8)
GLUCOSE SERPL-MCNC: 104 MG/DL — HIGH (ref 70–99)
HCT VFR BLD CALC: 43 % — SIGNIFICANT CHANGE UP (ref 42–52)
HGB BLD-MCNC: 14.4 G/DL — SIGNIFICANT CHANGE UP (ref 14–18)
INR BLD: 1.07 RATIO — SIGNIFICANT CHANGE UP (ref 0.65–1.3)
LIDOCAIN IGE QN: 21 U/L — SIGNIFICANT CHANGE UP (ref 7–60)
LYMPHOCYTES # BLD AUTO: 2.11 K/UL — SIGNIFICANT CHANGE UP (ref 1.2–3.4)
LYMPHOCYTES # BLD AUTO: 29 % — SIGNIFICANT CHANGE UP (ref 20.5–51.1)
MANUAL SMEAR VERIFICATION: SIGNIFICANT CHANGE UP
MCHC RBC-ENTMCNC: 30.3 PG — SIGNIFICANT CHANGE UP (ref 27–31)
MCHC RBC-ENTMCNC: 33.5 G/DL — SIGNIFICANT CHANGE UP (ref 32–37)
MCV RBC AUTO: 90.5 FL — SIGNIFICANT CHANGE UP (ref 80–94)
MONOCYTES # BLD AUTO: 0.22 K/UL — SIGNIFICANT CHANGE UP (ref 0.1–0.6)
MONOCYTES NFR BLD AUTO: 3 % — SIGNIFICANT CHANGE UP (ref 1.7–9.3)
NEUTROPHILS # BLD AUTO: 3.42 K/UL — SIGNIFICANT CHANGE UP (ref 1.4–6.5)
NEUTROPHILS NFR BLD AUTO: 46 % — SIGNIFICANT CHANGE UP (ref 42.2–75.2)
NEUTS BAND # BLD: 1 % — SIGNIFICANT CHANGE UP (ref 0–6)
NRBC # BLD: 0 /100 WBCS — SIGNIFICANT CHANGE UP (ref 0–0)
NRBC # BLD: SIGNIFICANT CHANGE UP /100 WBCS (ref 0–0)
PLAT MORPH BLD: NORMAL — SIGNIFICANT CHANGE UP
PLATELET # BLD AUTO: 168 K/UL — SIGNIFICANT CHANGE UP (ref 130–400)
PMV BLD: 11 FL — HIGH (ref 7.4–10.4)
POTASSIUM SERPL-MCNC: 4.6 MMOL/L — SIGNIFICANT CHANGE UP (ref 3.5–5)
POTASSIUM SERPL-SCNC: 4.6 MMOL/L — SIGNIFICANT CHANGE UP (ref 3.5–5)
PROT SERPL-MCNC: 6.7 G/DL — SIGNIFICANT CHANGE UP (ref 6–8)
PROTHROM AB SERPL-ACNC: 12.2 SEC — SIGNIFICANT CHANGE UP (ref 9.95–12.87)
RBC # BLD: 4.75 M/UL — SIGNIFICANT CHANGE UP (ref 4.7–6.1)
RBC # FLD: 13.2 % — SIGNIFICANT CHANGE UP (ref 11.5–14.5)
RBC BLD AUTO: NORMAL — SIGNIFICANT CHANGE UP
SODIUM SERPL-SCNC: 136 MMOL/L — SIGNIFICANT CHANGE UP (ref 135–146)
WBC # BLD: 7.27 K/UL — SIGNIFICANT CHANGE UP (ref 4.8–10.8)
WBC # FLD AUTO: 7.27 K/UL — SIGNIFICANT CHANGE UP (ref 4.8–10.8)

## 2024-06-10 PROCEDURE — 83690 ASSAY OF LIPASE: CPT

## 2024-06-10 PROCEDURE — 74177 CT ABD & PELVIS W/CONTRAST: CPT | Mod: 26,MC

## 2024-06-10 PROCEDURE — 86850 RBC ANTIBODY SCREEN: CPT

## 2024-06-10 PROCEDURE — 85610 PROTHROMBIN TIME: CPT

## 2024-06-10 PROCEDURE — 85730 THROMBOPLASTIN TIME PARTIAL: CPT

## 2024-06-10 PROCEDURE — 99285 EMERGENCY DEPT VISIT HI MDM: CPT

## 2024-06-10 PROCEDURE — 86900 BLOOD TYPING SEROLOGIC ABO: CPT

## 2024-06-10 PROCEDURE — 36415 COLL VENOUS BLD VENIPUNCTURE: CPT

## 2024-06-10 PROCEDURE — 99284 EMERGENCY DEPT VISIT MOD MDM: CPT | Mod: 25

## 2024-06-10 PROCEDURE — 85025 COMPLETE CBC W/AUTO DIFF WBC: CPT

## 2024-06-10 PROCEDURE — 74177 CT ABD & PELVIS W/CONTRAST: CPT | Mod: MC

## 2024-06-10 PROCEDURE — 80053 COMPREHEN METABOLIC PANEL: CPT

## 2024-06-10 PROCEDURE — 96374 THER/PROPH/DIAG INJ IV PUSH: CPT | Mod: XU

## 2024-06-10 PROCEDURE — 86901 BLOOD TYPING SEROLOGIC RH(D): CPT

## 2024-06-10 RX ORDER — SODIUM CHLORIDE 9 MG/ML
1000 INJECTION INTRAMUSCULAR; INTRAVENOUS; SUBCUTANEOUS ONCE
Refills: 0 | Status: COMPLETED | OUTPATIENT
Start: 2024-06-10 | End: 2024-06-10

## 2024-06-10 RX ORDER — ONDANSETRON 8 MG/1
4 TABLET, FILM COATED ORAL ONCE
Refills: 0 | Status: COMPLETED | OUTPATIENT
Start: 2024-06-10 | End: 2024-06-10

## 2024-06-10 RX ADMIN — SODIUM CHLORIDE 1000 MILLILITER(S): 9 INJECTION INTRAMUSCULAR; INTRAVENOUS; SUBCUTANEOUS at 20:34

## 2024-06-10 RX ADMIN — ONDANSETRON 4 MILLIGRAM(S): 8 TABLET, FILM COATED ORAL at 20:33

## 2024-06-10 NOTE — ED PROVIDER NOTE - PHYSICAL EXAMINATION
VITAL SIGNS: I have reviewed nursing notes and confirm.  CONSTITUTIONAL: non-toxic, well appearing  SKIN: no rash, no petechiae.  EYES: PERRL, pink conjunctiva, anicteric  ENT: tongue midline, no exudates, MMM  NECK: Supple; no meningismus  CARD: RRR, no murmurs, equal radial pulses bilaterally 2+  RESP: CTAB, no respiratory distress  ABD: Soft, no peritoneal signs, + diffuse abd ttp, no rebound or guarding   EXT: Normal ROM x4. No edema.   NEURO: Alert, oriented. no focal deficits   PSYCH: Cooperative, appropriate.

## 2024-06-10 NOTE — ED PROVIDER NOTE - CLINICAL SUMMARY MEDICAL DECISION MAKING FREE TEXT BOX
Labs and imaging obtained. Results reviewed and discussed with pt and family.  Copies given.  Rec pt f/u with PMD and GI. Pt instructed to return if any worsening symptoms or concerns.  They verbalize understanding.

## 2024-06-10 NOTE — ED PROVIDER NOTE - PATIENT PORTAL LINK FT
Notification Instructions: Patient will be notified of biopsy results. However, patient instructed to call the office if not contacted within 2 weeks. Consent: Written consent was obtained and risks were reviewed including but not limited to scarring, infection, bleeding, scabbing, incomplete removal, nerve damage and allergy to anesthesia. Anesthesia Type: 1% lidocaine with epinephrine Post-Care Instructions: I reviewed with the patient in detail post-care instructions. Patient is to keep the biopsy site dry overnight, and then apply bacitracin twice daily until healed. Patient may apply hydrogen peroxide soaks to remove any crusting. Detail Level: Detailed Punch Size In Mm: 3 Lab: [id number] Dressing: pressure dressing Additional Anesthesia Volume In Cc (Will Not Render If 0): 0 Render Post-Care Instructions In Note?: no Biopsy Type: H and E Lab Facility: [id number] Home Suture Removal Text: Patient was provided a home suture removal kit and will remove their sutures at home.  If they have any questions or difficulties they will call the office. Wound Care: Aquaphor Billing Type: Third-Party Bill Anesthesia Volume In Cc: 0.5 Hemostasis: Mata's Epidermal Sutures: gel foam You can access the FollowMyHealth Patient Portal offered by [facility] by registering at the following website: [email or web address]. By joining [employer]’s FollowMyHealth portal, you will also be able to view your health information using other applications (apps) compatible with our system.

## 2024-06-10 NOTE — ED PROVIDER NOTE - ATTENDING CONTRIBUTION TO CARE
58 yo M PMHx noted presents from [facility] for evaluation of abd pain for the last 3 days.  Today with loose stool, nml BM yesterday. Pt states he has a lot of gas, + nausea, no vomiting, no fever or chills, no dysuria. On exam pt in NAD AAO  x 3, pleasant, Abd is soft + BS, diffuse tenderness, no rash

## 2024-06-10 NOTE — ED PROVIDER NOTE - CARE PROVIDER_API CALL
Sheila Larose  Gastroenterology  [address] [place] [address]  Phone: [phone]  Fax: [phone]  Follow Up Time:

## 2024-06-10 NOTE — ED PROVIDER NOTE - NSFOLLOWUPINSTRUCTIONS_ED_ALL_ED_FT
Follow up with your primary care doctor in 1-2 days     Acute Abdominal Pain    WHAT YOU NEED TO KNOW:    The cause of your abdominal pain may not be found. If a cause is found, treatment will depend on what the cause is.     DISCHARGE INSTRUCTIONS:    Return to the emergency department if:     You vomit blood or cannot stop vomiting.      You have blood in your bowel movement or it looks like tar.       You have bleeding from your rectum.       Your abdomen is larger than usual, more painful, and hard.       You have severe pain in your abdomen.       You stop passing gas and having bowel movements.       You feel weak, dizzy, or faint.    Contact your healthcare provider if:     You have a fever.      You have new signs and symptoms.      Your symptoms do not get better with treatment.       You have questions or concerns about your condition or care.    Medicines may be given to decrease pain, treat an infection, and manage your symptoms. Take your medicine as directed. Call your healthcare provider if you think your medicine is not helping or if you have side effects. Tell him if you are allergic to any medicine. Keep a list of the medicines, vitamins, and herbs you take. Include the amounts, and when and why you take them. Bring the list or the pill bottles to follow-up visits. Carry your medicine list with you in case of an emergency.    Manage your symptoms:     Apply heat on your abdomen for 20 to 30 minutes every 2 hours for as many days as directed. Heat helps decrease pain and muscle spasms.       Manage your stress. Stress may cause abdominal pain. Your healthcare provider may recommend relaxation techniques and deep breathing exercises to help decrease your stress. Your healthcare provider may recommend you talk to someone about your stress or anxiety, such as a counselor or a trusted friend. Get plenty of sleep and exercise regularly.       Limit or do not drink alcohol. Alcohol can make your abdominal pain worse. Ask your healthcare provider if it is safe for you to drink alcohol. Also ask how much is safe for you to drink.       Do not smoke. Nicotine and other chemicals in cigarettes can damage your esophagus and stomach. Ask your healthcare provider for information if you currently smoke and need help to quit. E-cigarettes or smokeless tobacco still contain nicotine. Talk to your healthcare provider before you use these products.     Make changes to the food you eat as directed: Do not eat foods that cause abdominal pain or other symptoms. Eat small meals more often.     Eat more high-fiber foods if you are constipated. High-fiber foods include fruits, vegetables, whole-grain foods, and legumes.       Do not eat foods that cause gas if you have bloating. Examples include broccoli, cabbage, and cauliflower. Do not drink soda or carbonated drinks, because these may also cause gas.       Do not eat foods or drinks that contain sorbitol or fructose if you have diarrhea and bloating. Some examples are fruit juices, candy, jelly, and sugar-free gum.       Do not eat high-fat foods, such as fried foods, cheeseburgers, hot dogs, and desserts.      Limit or do not drink caffeine. Caffeine may make symptoms, such as heart burn or nausea, worse.       Drink plenty of liquids to prevent dehydration from diarrhea or vomiting. Ask your healthcare provider how much liquid to drink each day and which liquids are best for you.     Follow up with your healthcare provider as directed: Write down your questions so you remember to ask them during your visits.       © Copyright [employer] 2019 All illustrations and images included in CareNotes are the copyrighted property of [employer] Inc[employer] or [employer]

## 2024-06-10 NOTE — ED PROVIDER NOTE - OBJECTIVE STATEMENT
59-year-old male with past medical history of spinal fusion presents to the ED with diffuse abdominal pain for the past 3 days.  Associated with nausea.  Patient had 1 loose watery nonbloody episode of stool today.  Patient was referred to the ED from urgent care.  Patient reports he takes chronic opioids for his back pain, however he is on a bowel regimen as needed for constipation, did not take any laxative today as he had a loose episode of stool.  + flatulence. Able to tolerate po intake. Denies f/c cp sob vomiting urinary sx.

## 2024-06-15 ENCOUNTER — EMERGENCY (EMERGENCY)
Facility: HOSPITAL | Age: 59
LOS: 0 days | Discharge: HOME | End: 2024-06-15
Attending: EMERGENCY MEDICINE
Payer: MEDICAID

## 2024-06-15 VITALS
RESPIRATION RATE: 19 BRPM | TEMPERATURE: 98 F | SYSTOLIC BLOOD PRESSURE: 157 MMHG | HEIGHT: 71 IN | OXYGEN SATURATION: 99 % | WEIGHT: 184.09 LBS | HEART RATE: 54 BPM | DIASTOLIC BLOOD PRESSURE: 89 MMHG

## 2024-06-15 DIAGNOSIS — R19.7 DIARRHEA, UNSPECIFIED: ICD-10-CM

## 2024-06-15 DIAGNOSIS — R10.9 UNSPECIFIED ABDOMINAL PAIN: ICD-10-CM

## 2024-06-15 DIAGNOSIS — Z98.890 OTHER SPECIFIED POSTPROCEDURAL STATES: Chronic | ICD-10-CM

## 2024-06-15 PROCEDURE — 99282 EMERGENCY DEPT VISIT SF MDM: CPT

## 2024-06-15 PROCEDURE — 99283 EMERGENCY DEPT VISIT LOW MDM: CPT

## 2024-06-15 NOTE — ED ADULT TRIAGE NOTE - CHIEF COMPLAINT QUOTE
Patient seen at [facility] on Monday for abdominal pain and constipation presenting today for continuation of symptoms

## 2024-06-15 NOTE — ED ADULT NURSE NOTE - OBJECTIVE STATEMENT
pt with c/o abdominal pain and constipation on monday. was seen at [facility] , pt is back today with no improvement.

## 2024-06-15 NOTE — ED PROVIDER NOTE - PHYSICAL EXAMINATION
CONST: Well appearing in NAD  CARD: S1 S2; No jvd  RESP: Equal BS B/L, No wheezes, rhonchi or rales. No distress  GI: Soft, non-tender, non-distended. no cva tenderness. normal BS  SKIN: Warm, dry, no acute rashes. Good turgor

## 2024-06-15 NOTE — ED PROVIDER NOTE - OBJECTIVE STATEMENT
59-year-old male past medical history of spinal fusion, opiate use presents for evaluation.  Patient endorses having abdominal pain for the past week with associated diarrhea that stopped yesterday since has not had a bowel movement.  Patient states he has not restarted taking his stool softeners since diarrhea is stopped.  Denies fever, headache, chest pain, shortness of breath, weakness, numbness, dysuria, hematuria, vomiting.

## 2024-06-15 NOTE — ED PROVIDER NOTE - PATIENT PORTAL LINK FT
You can access the FollowMyHealth Patient Portal offered by [facility] by registering at the following website: [email or web address]. By joining [email or web address]’s FollowMyHealth portal, you will also be able to view your health information using other applications (apps) compatible with our system.

## 2024-06-15 NOTE — ED ADULT NURSE NOTE - NSFALLUNIVINTERV_ED_ALL_ED
Bed/Stretcher in lowest position, wheels locked, appropriate side rails in place/Call bell, personal items and telephone in reach/Instruct patient to call for assistance before getting out of bed/chair/stretcher/Non-slip footwear applied when patient is off stretcher/[place] to call system/Physically safe environment - no spills, clutter or unnecessary equipment/Purposeful proactive rounding/Room/bathroom lighting operational, light cord in reach

## 2024-06-15 NOTE — ED ADULT NURSE NOTE - CHIEF COMPLAINT QUOTE
Patient seen at [facility] on Monday for abdominal pain and constipation presenting today for continuation of symptoms

## 2024-06-15 NOTE — ED PROVIDER NOTE - CLINICAL SUMMARY MEDICAL DECISION MAKING FREE TEXT BOX
Patient presented for episode of abdominal pain and 1 episode of diarrhea that occurred yesterday.  States that he has had intermittent cramping in the abdomen over the last week which she states he gets from time to time and is ongoing for him.  Otherwise on arrival, patient afebrile, hemodynamically stable, abdomen completely nontender.  Patient tolerating p.o. in the ED.  No concern at this time for emergent intra-abdominal pathologies and patient only had 1 episode of diarrhea.  Given the above, will discharge home with outpatient follow up. Patient agreeable with plan. Agrees to return to ED for any new or worsening symptoms.

## 2024-06-15 NOTE — ED ADULT NURSE NOTE - NSSUHOSCREENINGYN_ED_ALL_ED
The patient is a 57y Male complaining of suicidal thoughts. Yes - the patient is able to be screened

## 2024-06-15 NOTE — ED PROVIDER NOTE - CARE PROVIDER_API CALL
Sheila Larose  Gastroenterology  [address] [place] [address]  Phone: [phone]  Fax: [phone]  Follow Up Time:

## 2024-06-15 NOTE — ED PROVIDER NOTE - NSICDXPASTSURGICALHX_GEN_ALL_CORE_FT
The catheter was inserted into the aorta. The angiography was performed via hand injection with 6 mL of contrast. PAST SURGICAL HISTORY:  History of back surgery

## 2024-06-21 ENCOUNTER — APPOINTMENT (OUTPATIENT)
Dept: NEUROSURGERY | Facility: CLINIC | Age: 59
End: 2024-06-21
Payer: MEDICAID

## 2024-06-21 VITALS — WEIGHT: 180 LBS | BODY MASS INDEX: 25.2 KG/M2 | HEIGHT: 71 IN

## 2024-06-21 DIAGNOSIS — M96.1 POSTLAMINECTOMY SYNDROME, NOT ELSEWHERE CLASSIFIED: ICD-10-CM

## 2024-06-21 DIAGNOSIS — G89.4 CHRONIC PAIN SYNDROME: ICD-10-CM

## 2024-06-21 DIAGNOSIS — Z98.1 ARTHRODESIS STATUS: ICD-10-CM

## 2024-06-21 PROCEDURE — 99204 OFFICE O/P NEW MOD 45 MIN: CPT

## 2024-06-21 NOTE — ASSESSMENT
[FreeTextEntry1] : Mr. 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 Patient with history of LBP dating back as 20 years ago. 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 S/P spinal fusion with Dr. Peña S/P revision with Dr. Katlyn Villafuerte in [facility]. Dr. Villafuerte no longer taking insurance thus looking to reestablish care today.  PLAN -MRI Tand L spine as patient has no recent imaging -Refer to Dr. Meneses for evaluation for possible SCS -Return following completion of MRI T and L spine to follow-up with Dr. Meneses  All the patient and his fiancee's questions were answered and they were in full agreement with the plan above.  Thank you for allowing us to participate in your care.  Sincerely,   SHAINA Velez-BC  Nurse Practitioner [facility]     Tong Dyer MD  Department of Neurosurgery [facility] [employer] [facility].

## 2024-06-21 NOTE — HISTORY OF PRESENT ILLNESS
[de-identified] : Mr. Bower is a 59 y.o male presenting for Neurosurgical consultation with complaints of chronic LBP and history of L4-S1 bilateral instrumentation and fusion with failed spinal surgery syndrome. Patient with history of LBP dating back as 20 years ago. 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 S/P spinal fusion with Dr. Peña S/P revision with Dr. Katlyn Villafuerte in [facility]. Dr. Villafuerte no longer taking insurance thus looking to reestablish care today.  Of note: Followed with Dr. Víctor Alvarez for Pain Management, reports having ablations which have been minimally effective. Recently was recommended for a SCS but has not moved forward as hesitant. Has also seen Dr Brasher but reports that Dr. Brasher has since stopped prescribing PO pain medication which resulted in his recent [facility] ED visit in which he was given a referral to Neurosurgery.   Today he endorses that pain is worse in the morning across lower back and radiating down L leg into toes. Notes that pain greatly impact his quality of life and that his back pain is worse than his leg pain.  He has no signs or symptoms of cauda equina syndrome at this time.  Physical exam:  Constitutional: Well appearing, no distress HEENT: Normocephalic Atraumatic Psychiatric: Awake, alert, oriented to person, place, situation and time. Normal affect. Follows commands. Language: Speech is clear, fluent with good repetition & comprehension. No dysarthria. Pulmonary: No respiratory distress  Neurologic: CN II-XII intact; EOMI with no nystagmus. No facial asymmetry bilaterally, full eye closure strength bilaterally. Hearing grossly normal. Head turning & shoulder shrug intact, bilaterally. Tongue midline, normal movements, no atrophy. Smile symmetrical. Palpation: pain to mid and lower back when palpated SKIN: Mottled back Strength: Full strength in all major muscle groups Sensation: Full sensation to light touch in all extremities Motor: No pronator drift, muscle strength of bilateral UE and bilateral LE: 5/5. Normal muscle tone. Reflexes: DTR of biceps, knee normal 2+ biceps 2+ patellar  No Clonus No Todd's   ROM intact  Straight-Leg Raise Test Left: Negative Straight-Leg Raise Test Right: Negative  Ambulates with the assistance of a cane; positive antalgic gait secondary to lower back pain

## 2024-06-21 NOTE — END OF VISIT
[FreeTextEntry3] : I have independently evaluated and examined this patient, have reviewed available imaging and have supervised and agree with the Advanced Clinical Practice provider and their history and physical and assessment and plan above. [Time Spent: ___ minutes] : I have spent [unfilled] minutes of time on the encounter.

## 2024-06-24 ENCOUNTER — NON-APPOINTMENT (OUTPATIENT)
Age: 59
End: 2024-06-24

## 2024-10-10 ENCOUNTER — APPOINTMENT (OUTPATIENT)
Dept: NEUROSURGERY | Facility: CLINIC | Age: 59
End: 2024-10-10

## 2024-10-25 RX ORDER — DIAZEPAM 5 MG/1
5 TABLET ORAL
Qty: 1 | Refills: 0 | Status: ACTIVE | COMMUNITY
Start: 2024-10-25 | End: 1900-01-01

## 2024-11-25 ENCOUNTER — APPOINTMENT (OUTPATIENT)
Dept: NEUROSURGERY | Facility: CLINIC | Age: 59
End: 2024-11-25
Payer: MEDICAID

## 2024-11-25 ENCOUNTER — NON-APPOINTMENT (OUTPATIENT)
Age: 59
End: 2024-11-25

## 2024-11-25 VITALS — WEIGHT: 180 LBS | HEIGHT: 71 IN | BODY MASS INDEX: 25.2 KG/M2

## 2024-11-25 DIAGNOSIS — G89.4 CHRONIC PAIN SYNDROME: ICD-10-CM

## 2024-11-25 PROCEDURE — 99215 OFFICE O/P EST HI 40 MIN: CPT

## 2024-11-25 RX ORDER — MORPHINE SULFATE 30 MG/1
30 TABLET ORAL
Refills: 0 | Status: ACTIVE | COMMUNITY

## 2024-12-10 ENCOUNTER — APPOINTMENT (OUTPATIENT)
Dept: GASTROENTEROLOGY | Facility: CLINIC | Age: 59
End: 2024-12-10

## 2025-05-13 ENCOUNTER — APPOINTMENT (OUTPATIENT)
Facility: CLINIC | Age: 60
End: 2025-05-13
Payer: MEDICAID

## 2025-05-13 DIAGNOSIS — R05.3 CHRONIC COUGH: ICD-10-CM

## 2025-05-13 DIAGNOSIS — R06.02 SHORTNESS OF BREATH: ICD-10-CM

## 2025-05-13 PROCEDURE — 99204 OFFICE O/P NEW MOD 45 MIN: CPT

## 2025-05-13 PROCEDURE — G0296 VISIT TO DETERM LDCT ELIG: CPT

## 2025-05-13 RX ORDER — BUDESONIDE AND FORMOTEROL FUMARATE DIHYDRATE 160; 4.5 UG/1; UG/1
160-4.5 AEROSOL RESPIRATORY (INHALATION) TWICE DAILY
Qty: 1 | Refills: 3 | Status: ACTIVE | COMMUNITY
Start: 2025-05-13 | End: 1900-01-01

## 2025-05-13 RX ORDER — ALBUTEROL SULFATE 90 UG/1
108 (90 BASE) INHALANT RESPIRATORY (INHALATION)
Qty: 1 | Refills: 3 | Status: ACTIVE | COMMUNITY
Start: 2025-05-13 | End: 1900-01-01

## 2025-05-19 ENCOUNTER — APPOINTMENT (OUTPATIENT)
Dept: CARDIOTHORACIC SURGERY | Facility: CLINIC | Age: 60
End: 2025-05-19
Payer: MEDICAID

## 2025-05-19 VITALS — WEIGHT: 180 LBS | HEIGHT: 71 IN | BODY MASS INDEX: 25.2 KG/M2

## 2025-05-19 DIAGNOSIS — Z87.891 PERSONAL HISTORY OF NICOTINE DEPENDENCE: ICD-10-CM

## 2025-05-19 PROCEDURE — G0296 VISIT TO DETERM LDCT ELIG: CPT

## 2025-05-22 ENCOUNTER — APPOINTMENT (OUTPATIENT)
Dept: PULMONOLOGY | Facility: HOSPITAL | Age: 60
End: 2025-05-22

## 2025-05-22 ENCOUNTER — OUTPATIENT (OUTPATIENT)
Dept: OUTPATIENT SERVICES | Facility: HOSPITAL | Age: 60
LOS: 1 days | End: 2025-05-22
Payer: MEDICAID

## 2025-05-22 DIAGNOSIS — R06.2 WHEEZING: ICD-10-CM

## 2025-05-22 DIAGNOSIS — Z98.890 OTHER SPECIFIED POSTPROCEDURAL STATES: Chronic | ICD-10-CM

## 2025-05-22 PROCEDURE — 94070 EVALUATION OF WHEEZING: CPT

## 2025-05-22 PROCEDURE — 94727 GAS DIL/WSHOT DETER LNG VOL: CPT | Mod: 26

## 2025-05-22 PROCEDURE — 94729 DIFFUSING CAPACITY: CPT | Mod: 26

## 2025-05-22 PROCEDURE — 94060 EVALUATION OF WHEEZING: CPT | Mod: 26

## 2025-05-22 PROCEDURE — 94729 DIFFUSING CAPACITY: CPT

## 2025-05-22 PROCEDURE — 94727 GAS DIL/WSHOT DETER LNG VOL: CPT

## 2025-05-22 PROCEDURE — 94664 DEMO&/EVAL PT USE INHALER: CPT

## 2025-05-23 DIAGNOSIS — R06.2 WHEEZING: ICD-10-CM

## 2025-05-30 ENCOUNTER — OUTPATIENT (OUTPATIENT)
Dept: OUTPATIENT SERVICES | Facility: HOSPITAL | Age: 60
LOS: 1 days | End: 2025-05-30
Payer: MEDICAID

## 2025-05-30 ENCOUNTER — RESULT REVIEW (OUTPATIENT)
Age: 60
End: 2025-05-30

## 2025-05-30 DIAGNOSIS — R06.02 SHORTNESS OF BREATH: ICD-10-CM

## 2025-05-30 DIAGNOSIS — Z00.8 ENCOUNTER FOR OTHER GENERAL EXAMINATION: ICD-10-CM

## 2025-05-30 DIAGNOSIS — Z98.890 OTHER SPECIFIED POSTPROCEDURAL STATES: Chronic | ICD-10-CM

## 2025-05-30 PROCEDURE — 71271 CT THORAX LUNG CANCER SCR C-: CPT | Mod: 26

## 2025-05-30 PROCEDURE — 71271 CT THORAX LUNG CANCER SCR C-: CPT

## 2025-05-31 DIAGNOSIS — R06.02 SHORTNESS OF BREATH: ICD-10-CM

## 2025-06-06 ENCOUNTER — APPOINTMENT (OUTPATIENT)
Dept: PULMONOLOGY | Facility: CLINIC | Age: 60
End: 2025-06-06

## 2025-06-13 ENCOUNTER — APPOINTMENT (OUTPATIENT)
Dept: PULMONOLOGY | Facility: CLINIC | Age: 60
End: 2025-06-13
Payer: MEDICAID

## 2025-06-13 VITALS
BODY MASS INDEX: 25.8 KG/M2 | RESPIRATION RATE: 14 BRPM | DIASTOLIC BLOOD PRESSURE: 80 MMHG | OXYGEN SATURATION: 96 % | SYSTOLIC BLOOD PRESSURE: 140 MMHG | WEIGHT: 185 LBS | HEART RATE: 75 BPM

## 2025-06-13 PROBLEM — J45.998 POST VIRAL RAD (REACTIVE AIRWAY DISEASE): Status: ACTIVE | Noted: 2025-06-13

## 2025-06-13 PROBLEM — K21.9 GERD (GASTROESOPHAGEAL REFLUX DISEASE): Status: ACTIVE | Noted: 2025-06-13

## 2025-06-13 PROBLEM — R06.09 DYSPNEA ON EXERTION: Status: ACTIVE | Noted: 2025-06-13

## 2025-06-13 PROCEDURE — 99214 OFFICE O/P EST MOD 30 MIN: CPT

## 2025-06-13 RX ORDER — BUDESONIDE AND FORMOTEROL FUMARATE DIHYDRATE 80; 4.5 UG/1; UG/1
80-4.5 AEROSOL RESPIRATORY (INHALATION) TWICE DAILY
Qty: 1 | Refills: 3 | Status: ACTIVE | COMMUNITY
Start: 2025-06-13 | End: 1900-01-01

## 2025-06-16 LAB
A1AT SERPL-MCNC: 158 MG/DL
ALBUMIN SERPL ELPH-MCNC: 5 G/DL
ALP BLD-CCNC: 87 U/L
ALT SERPL-CCNC: 14 U/L
ANION GAP SERPL CALC-SCNC: 13 MMOL/L
AST SERPL-CCNC: 19 U/L
BASOPHILS # BLD AUTO: 0.06 K/UL
BASOPHILS NFR BLD AUTO: 0.8 %
BILIRUB SERPL-MCNC: 0.5 MG/DL
BUN SERPL-MCNC: 20 MG/DL
CALCIUM SERPL-MCNC: 9.7 MG/DL
CHLORIDE SERPL-SCNC: 101 MMOL/L
CO2 SERPL-SCNC: 22 MMOL/L
CREAT SERPL-MCNC: 0.8 MG/DL
EGFRCR SERPLBLD CKD-EPI 2021: 101 ML/MIN/1.73M2
EOSINOPHIL # BLD AUTO: 0.32 K/UL
EOSINOPHIL NFR BLD AUTO: 4.2 %
GLUCOSE SERPL-MCNC: 106 MG/DL
HCT VFR BLD CALC: 45.9 %
HGB BLD-MCNC: 15.1 G/DL
IMM GRANULOCYTES NFR BLD AUTO: 1.1 %
LYMPHOCYTES # BLD AUTO: 2.15 K/UL
LYMPHOCYTES NFR BLD AUTO: 28.4 %
MAN DIFF?: NORMAL
MCHC RBC-ENTMCNC: 29.2 PG
MCHC RBC-ENTMCNC: 32.9 G/DL
MCV RBC AUTO: 88.8 FL
MONOCYTES # BLD AUTO: 0.65 K/UL
MONOCYTES NFR BLD AUTO: 8.6 %
NEUTROPHILS # BLD AUTO: 4.32 K/UL
NEUTROPHILS NFR BLD AUTO: 56.9 %
PLATELET # BLD AUTO: 191 K/UL
PMV BLD AUTO: 0 /100 WBCS
PMV BLD: 11.7 FL
POTASSIUM SERPL-SCNC: 4 MMOL/L
PROT SERPL-MCNC: 7.3 G/DL
RBC # BLD: 5.17 M/UL
RBC # FLD: 12.8 %
SODIUM SERPL-SCNC: 136 MMOL/L
WBC # FLD AUTO: 7.58 K/UL

## 2025-06-19 ENCOUNTER — APPOINTMENT (OUTPATIENT)
Age: 60
End: 2025-06-19